# Patient Record
Sex: MALE | Race: BLACK OR AFRICAN AMERICAN | Employment: OTHER | URBAN - METROPOLITAN AREA
[De-identification: names, ages, dates, MRNs, and addresses within clinical notes are randomized per-mention and may not be internally consistent; named-entity substitution may affect disease eponyms.]

---

## 2017-08-02 ENCOUNTER — HOSPITAL ENCOUNTER (OUTPATIENT)
Dept: ULTRASOUND IMAGING | Age: 77
Discharge: HOME | End: 2017-08-02
Attending: NURSE PRACTITIONER
Payer: MEDICARE

## 2017-08-02 DIAGNOSIS — K74.69 OTHER CIRRHOSIS OF LIVER (HCC): ICD-10-CM

## 2017-08-02 DIAGNOSIS — B18.2 CHRONIC HEPATITIS C (HCC): ICD-10-CM

## 2017-08-02 PROCEDURE — 76705 ECHO EXAM OF ABDOMEN: CPT

## 2018-01-25 ENCOUNTER — HOSPITAL ENCOUNTER (OUTPATIENT)
Dept: ULTRASOUND IMAGING | Age: 78
Discharge: HOME | End: 2018-01-25
Attending: NURSE PRACTITIONER
Payer: MEDICARE

## 2018-01-25 DIAGNOSIS — K74.60 CIRRHOSIS (HCC): ICD-10-CM

## 2018-01-25 PROCEDURE — 76705 ECHO EXAM OF ABDOMEN: CPT

## 2018-07-19 ENCOUNTER — HOSPITAL ENCOUNTER (OUTPATIENT)
Dept: ULTRASOUND IMAGING | Age: 78
Discharge: HOME | End: 2018-07-19
Attending: NURSE PRACTITIONER
Payer: MEDICARE

## 2018-07-19 DIAGNOSIS — K74.60 CIRRHOSIS (HCC): ICD-10-CM

## 2018-07-19 PROCEDURE — 76705 ECHO EXAM OF ABDOMEN: CPT

## 2019-03-01 ENCOUNTER — HOSPITAL ENCOUNTER (OUTPATIENT)
Dept: ULTRASOUND IMAGING | Age: 79
Discharge: HOME | End: 2019-03-01
Attending: NURSE PRACTITIONER
Payer: MEDICARE

## 2019-03-01 DIAGNOSIS — K74.60 UNSPECIFIED CIRRHOSIS OF LIVER (HCC): ICD-10-CM

## 2019-03-01 PROCEDURE — 76705 ECHO EXAM OF ABDOMEN: CPT

## 2020-09-29 ENCOUNTER — HOSPITAL ENCOUNTER (EMERGENCY)
Age: 80
Discharge: HOME | End: 2020-09-29
Attending: EMERGENCY MEDICINE
Payer: MEDICARE

## 2020-09-29 VITALS
RESPIRATION RATE: 16 BRPM | TEMPERATURE: 98 F | OXYGEN SATURATION: 99 % | BODY MASS INDEX: 23.74 KG/M2 | SYSTOLIC BLOOD PRESSURE: 191 MMHG | HEART RATE: 69 BPM | WEIGHT: 185 LBS | HEIGHT: 74 IN | DIASTOLIC BLOOD PRESSURE: 106 MMHG

## 2020-09-29 DIAGNOSIS — R13.10 DYSPHAGIA, UNSPECIFIED TYPE: Primary | ICD-10-CM

## 2020-09-29 PROCEDURE — 99282 EMERGENCY DEPT VISIT SF MDM: CPT

## 2020-09-30 NOTE — DISCHARGE INSTRUCTIONS
Return for pain, any difficulty swallowing, fever not resolving with motrin or tylenol, shortness of breath, vomiting, decreased fluid intake, weakness, numbness, dizziness, or any change or concerns. Patient Education        Learning About the Diet for Swallowing Problems  What are swallowing problems? Difficulty swallowing is also called dysphagia (say \"ypp-NGA-qxz-jasvir\"). It is most often a sign of a problem with your throat or esophagus. This is the tube that moves food and liquids from the back of your mouth to your stomach. Trouble swallowing can occur when the muscles and nerves that move food through the throat and esophagus are not working right. To help you swallow food, your doctor or speech therapist may advise a special dysphagia diet for you. Why is a special diet important? A dysphagia diet can help you handle some problems that can occur when it's hard to swallow food and liquids easily. These problems can include:  · Malnutrition. This means you aren't getting enough healthy foods to keep your body working well. · Dehydration. This means you aren't getting enough liquids to keep your body healthy. · Aspiration. This means that food, liquid, or saliva goes down your windpipe (trachea) into your lungs, instead of down your esophagus to your stomach. This can lead to aspiration pneumonia, which is an inflammation of the lungs. What is the dysphagia diet? In the dysphagia diet, you change the foods you eat and the liquids you drink to make it easier to swallow them. You may:  · Change the texture of the foods you eat. Your doctor or speech therapist may advise you to eat one of these types of foods:  ? Easy-to-chew foods. These are foods that are soft or tender. ? Soft and bite-sized foods. These are soft foods that have been cut into small pieces. ? Minced and moist foods. These are very soft, small, and moist lumps of food that need very little chewing. ? Pureed foods.  These are foods that have been blended smooth. The puree must be thick enough to hold its shape on a spoon. These foods don't need to be chewed. ? Liquidized foods. These are foods that have been blended smooth but are not as thick as pureed foods. You can drink them from a cup. · Thicken the liquids you drink. Your doctor or speech therapist will tell you what kind of thickener to use and how thick to make the liquids. ? Slightly thick liquids. These are thicker than water but can flow through a straw. ? Mildly thick liquids. These can be sipped from a cup but take some effort to drink with a straw. ? Moderately thick liquids. These liquids are thick enough to drink from a cup or from a spoon. But they are hard to drink through a wide straw. ? Extremely thick liquids. These are thick enough to hold their shape on a spoon. They are too thick to drink from a cup or suck through a straw. Your speech therapist will help you learn exercises to train your muscles to work together so you can swallow. You may also need to learn how to position your body or how to put food in your mouth to be able to swallow better. Follow-up care is a key part of your treatment and safety. Be sure to make and go to all appointments, and call your doctor if you are having problems. It's also a good idea to know your test results and keep a list of the medicines you take. Where can you learn more? Go to http://www.gray.com/  Enter Z250 in the search box to learn more about \"Learning About the Diet for Swallowing Problems. \"  Current as of: June 26, 2019               Content Version: 12.6  © 2516-6183 [employer]. Care instructions adapted under license by [employer] (which disclaims liability or warranty for this information).  If you have questions about a medical condition or this instruction, always ask your healthcare professional. Eldon Swanson disclaims any warranty or liability for your use of this information.

## 2020-09-30 NOTE — ED PROVIDER NOTES
Pt c/o difficulty swallowing steak. Says one time 5 yrs ago, again tonight, felt like stuck in mid chest, was able to swallow it prior to being seen. No current sx's. No cp, no abd pain, no nausea, no fever or cough. No sob. No leg pain or swelling. No weakness or numbness. No diff swallowing at all currently. Says has gi physician, w appt on 10/10, declines further evaluation, req dc to f/u w him. Past Medical History:   Diagnosis Date    Hydrocele     Hypertension        Past Surgical History:   Procedure Laterality Date    HX BUNIONECTOMY Right     HX HERNIA REPAIR Right     HX OTHER SURGICAL      hydrocele surgery         No family history on file.     Social History     Socioeconomic History    Marital status:      Spouse name: Not on file    Number of children: Not on file    Years of education: Not on file    Highest education level: Not on file   Occupational History    Not on file   Social Needs    Financial resource strain: Not on file    Food insecurity     Worry: Not on file     Inability: Not on file    Transportation needs     Medical: Not on file     Non-medical: Not on file   Tobacco Use    Smoking status: Never Smoker    Smokeless tobacco: Never Used   Substance and Sexual Activity    Alcohol use: No    Drug use: No    Sexual activity: Not on file   Lifestyle    Physical activity     Days per week: Not on file     Minutes per session: Not on file    Stress: Not on file   Relationships    Social connections     Talks on phone: Not on file     Gets together: Not on file     Attends Taoism service: Not on file     Active member of club or organization: Not on file     Attends meetings of clubs or organizations: Not on file     Relationship status: Not on file    Intimate partner violence     Fear of current or ex partner: Not on file     Emotionally abused: Not on file     Physically abused: Not on file     Forced sexual activity: Not on file   Other Topics Concern    Not on file   Social History Narrative    Not on file         ALLERGIES: Patient has no known allergies. Review of Systems   Constitutional: Negative for diaphoresis and fever. HENT: Negative for congestion. Respiratory: Negative for cough and shortness of breath. Cardiovascular: Negative for chest pain. Gastrointestinal: Negative for abdominal pain and nausea. Musculoskeletal: Negative for back pain. Skin: Negative for rash. Neurological: Negative for dizziness. All other systems reviewed and are negative. Vitals:    09/29/20 2102   BP: (!) 191/106   Pulse: 69   Resp: 16   Temp: 98 °F (36.7 °C)   SpO2: 99%   Weight: 83.9 kg (185 lb)   Height: 6' 2\" (1.88 m)            Physical Exam  Vitals signs and nursing note reviewed. Constitutional:       Appearance: He is well-developed. HENT:      Head: Normocephalic and atraumatic. Comments: No difficulty w secretions  Eyes:      Conjunctiva/sclera: Conjunctivae normal.   Neck:      Musculoskeletal: Normal range of motion. Cardiovascular:      Rate and Rhythm: Normal rate and regular rhythm. Pulmonary:      Effort: Pulmonary effort is normal.      Breath sounds: No wheezing. Abdominal:      Palpations: Abdomen is soft. Tenderness: There is no abdominal tenderness. Musculoskeletal:         General: No tenderness. Skin:     General: Skin is warm and dry. Capillary Refill: Capillary refill takes less than 2 seconds. Findings: No rash. Neurological:      Mental Status: He is alert and oriented to person, place, and time. MDM       Procedures    Vitals:  Patient Vitals for the past 12 hrs:   Temp Pulse Resp BP SpO2   09/29/20 2102 98 °F (36.7 °C) 69 16 (!) 191/106 99 %         Medications ordered:   Medications - No data to display      Lab findings:  No results found for this or any previous visit (from the past 12 hour(s)).         X-Ray, CT or other radiology findings or impressions:  No orders to display       Progress notes, Consult notes or additional Procedure notes:   No diff swallowing. Pt declines further eval, no sx's, aware needs to avoid steak, no solids, chew better, smaller bites when restarts. No emc. Diagnosis:   1. Dysphagia, unspecified type        Disposition: home    Follow-up Information     Follow up With Specialties Details Why [facility] EM[facility] Emergency Medicine Go to As needed, If symptoms worsen [address] [facility]    Zachery Webb MD Gastroenterology Schedule an appointment as soon as possible for a visit in 2 days  [address] [facility] [address] [facility] [address] [facility]      Miley Farfan MD Internal Medicine Schedule an appointment as soon as possible for a visit in 1 day  127   [phone]             Discharge Medication List as of 9/29/2020  9:37 PM      CONTINUE these medications which have NOT CHANGED    Details   nebivolol (BYSTOLIC) 10 mg tablet Take 10 mg by mouth daily. , Historical Med      amLODIPine (NORVASC) 5 mg tablet Take 5 mg by mouth daily. , Historical Med

## 2020-09-30 NOTE — ED NOTES
Pt discharged to home; refused repeat BP at this time. Pt instructed to monitor his bp, follow up with his PCP, to eat small bites, chew thoroughly before swallowing. Pt instructed to return to ED for any choking/other concerns. Pt voiced his understanding. Ambulatory to home without difficulty; airway remains patent, converses without difficulty, and demonstrates no dyspnea.

## 2020-09-30 NOTE — ED TRIAGE NOTES
Patient presents to ER for C/O difficulty swallowing steak tonight, states this is the 2nd time in 3 months he has had difficulty swallowing.

## 2021-01-01 ENCOUNTER — OFFICE VISIT (OUTPATIENT)
Dept: FAMILY MEDICINE CLINIC | Age: 81
End: 2021-01-01
Payer: MEDICARE

## 2021-01-01 ENCOUNTER — APPOINTMENT (OUTPATIENT)
Dept: CT IMAGING | Age: 81
DRG: 064 | End: 2021-01-01
Attending: STUDENT IN AN ORGANIZED HEALTH CARE EDUCATION/TRAINING PROGRAM
Payer: MEDICARE

## 2021-01-01 ENCOUNTER — HOSPITAL ENCOUNTER (INPATIENT)
Age: 81
LOS: 2 days | Discharge: HOME | DRG: 064 | End: 2021-10-29
Attending: STUDENT IN AN ORGANIZED HEALTH CARE EDUCATION/TRAINING PROGRAM | Admitting: INTERNAL MEDICINE
Payer: MEDICARE

## 2021-01-01 ENCOUNTER — HOSPITAL ENCOUNTER (EMERGENCY)
Age: 81
Discharge: HOME | End: 2021-09-14
Attending: EMERGENCY MEDICINE
Payer: MEDICARE

## 2021-01-01 ENCOUNTER — APPOINTMENT (OUTPATIENT)
Dept: GENERAL RADIOLOGY | Age: 81
DRG: 064 | End: 2021-01-01
Attending: STUDENT IN AN ORGANIZED HEALTH CARE EDUCATION/TRAINING PROGRAM
Payer: MEDICARE

## 2021-01-01 ENCOUNTER — APPOINTMENT (OUTPATIENT)
Age: 81
DRG: 064 | End: 2021-01-01
Attending: STUDENT IN AN ORGANIZED HEALTH CARE EDUCATION/TRAINING PROGRAM
Payer: MEDICARE

## 2021-01-01 ENCOUNTER — APPOINTMENT (OUTPATIENT)
Dept: NON INVASIVE DIAGNOSTICS | Age: 81
DRG: 064 | End: 2021-01-01
Attending: STUDENT IN AN ORGANIZED HEALTH CARE EDUCATION/TRAINING PROGRAM
Payer: MEDICARE

## 2021-01-01 VITALS
RESPIRATION RATE: 18 BRPM | TEMPERATURE: 97.8 F | HEART RATE: 65 BPM | DIASTOLIC BLOOD PRESSURE: 80 MMHG | HEIGHT: 74 IN | OXYGEN SATURATION: 100 % | SYSTOLIC BLOOD PRESSURE: 157 MMHG | BODY MASS INDEX: 21.69 KG/M2 | WEIGHT: 169 LBS

## 2021-01-01 VITALS
HEART RATE: 64 BPM | SYSTOLIC BLOOD PRESSURE: 212 MMHG | WEIGHT: 155 LBS | RESPIRATION RATE: 23 BRPM | BODY MASS INDEX: 19.89 KG/M2 | HEIGHT: 74 IN | DIASTOLIC BLOOD PRESSURE: 104 MMHG | TEMPERATURE: 97.8 F | OXYGEN SATURATION: 99 %

## 2021-01-01 VITALS
WEIGHT: 167.2 LBS | HEIGHT: 74 IN | OXYGEN SATURATION: 97 % | HEART RATE: 77 BPM | BODY MASS INDEX: 21.46 KG/M2 | TEMPERATURE: 98.4 F | RESPIRATION RATE: 16 BRPM | DIASTOLIC BLOOD PRESSURE: 82 MMHG | SYSTOLIC BLOOD PRESSURE: 168 MMHG

## 2021-01-01 VITALS
OXYGEN SATURATION: 99 % | BODY MASS INDEX: 25.06 KG/M2 | RESPIRATION RATE: 18 BRPM | DIASTOLIC BLOOD PRESSURE: 90 MMHG | HEART RATE: 73 BPM | WEIGHT: 185 LBS | SYSTOLIC BLOOD PRESSURE: 195 MMHG | HEIGHT: 72 IN | TEMPERATURE: 97.7 F

## 2021-01-01 DIAGNOSIS — Z09 HOSPITAL DISCHARGE FOLLOW-UP: Primary | ICD-10-CM

## 2021-01-01 DIAGNOSIS — Z76.89 ENCOUNTER TO ESTABLISH CARE: ICD-10-CM

## 2021-01-01 DIAGNOSIS — Z00.00 MEDICARE ANNUAL WELLNESS VISIT, SUBSEQUENT: Primary | ICD-10-CM

## 2021-01-01 DIAGNOSIS — R29.6 FREQUENT FALLS: ICD-10-CM

## 2021-01-01 DIAGNOSIS — N18.9 CHRONIC KIDNEY DISEASE, UNSPECIFIED CKD STAGE: ICD-10-CM

## 2021-01-01 DIAGNOSIS — I51.7 LEFT ATRIAL DILATION: ICD-10-CM

## 2021-01-01 DIAGNOSIS — I10 ESSENTIAL HYPERTENSION: ICD-10-CM

## 2021-01-01 DIAGNOSIS — D64.9 ANEMIA REQUIRING TRANSFUSIONS: ICD-10-CM

## 2021-01-01 DIAGNOSIS — D63.1 ANEMIA DUE TO CHRONIC KIDNEY DISEASE, UNSPECIFIED CKD STAGE: Primary | ICD-10-CM

## 2021-01-01 DIAGNOSIS — M21.6X2: ICD-10-CM

## 2021-01-01 DIAGNOSIS — Z71.89 ACP (ADVANCE CARE PLANNING): ICD-10-CM

## 2021-01-01 DIAGNOSIS — R27.0 ATAXIA: Primary | ICD-10-CM

## 2021-01-01 DIAGNOSIS — I63.9 CEREBROVASCULAR ACCIDENT (CVA), UNSPECIFIED MECHANISM (HCC): ICD-10-CM

## 2021-01-01 DIAGNOSIS — Z99.2 ESRD (END STAGE RENAL DISEASE) ON DIALYSIS (HCC): ICD-10-CM

## 2021-01-01 DIAGNOSIS — N18.9 ANEMIA DUE TO CHRONIC KIDNEY DISEASE, UNSPECIFIED CKD STAGE: Primary | ICD-10-CM

## 2021-01-01 DIAGNOSIS — N18.6 ESRD (END STAGE RENAL DISEASE) ON DIALYSIS (HCC): ICD-10-CM

## 2021-01-01 DIAGNOSIS — B18.2 CHRONIC HEPATITIS C WITHOUT HEPATIC COMA (HCC): ICD-10-CM

## 2021-01-01 DIAGNOSIS — R79.89 ELEVATED BRAIN NATRIURETIC PEPTIDE (BNP) LEVEL: ICD-10-CM

## 2021-01-01 DIAGNOSIS — R29.898 WEAKNESS OF RIGHT LOWER EXTREMITY: ICD-10-CM

## 2021-01-01 LAB
ABO + RH BLD: NORMAL
ABO + RH BLD: NORMAL
ALBUMIN SERPL-MCNC: 3 G/DL (ref 3.4–5)
ALBUMIN SERPL-MCNC: 3.3 G/DL (ref 3.4–5)
ALBUMIN/GLOB SERPL: 0.7 {RATIO} (ref 0.8–1.7)
ALBUMIN/GLOB SERPL: 0.8 {RATIO} (ref 0.8–1.7)
ALP SERPL-CCNC: 242 U/L (ref 45–117)
ALP SERPL-CCNC: 445 U/L (ref 45–117)
ALT SERPL-CCNC: 355 U/L (ref 16–61)
ALT SERPL-CCNC: 55 U/L (ref 16–61)
AMMONIA PLAS-SCNC: 22 UMOL/L (ref 11–32)
AMPHET UR QL SCN: NEGATIVE
ANION GAP SERPL CALC-SCNC: 6 MMOL/L (ref 3–18)
ANION GAP SERPL CALC-SCNC: 8 MMOL/L (ref 3–18)
ANION GAP SERPL CALC-SCNC: 9 MMOL/L (ref 3–18)
ANION GAP SERPL CALC-SCNC: 9 MMOL/L (ref 3–18)
APPEARANCE UR: CLEAR
APTT PPP: 30 SEC (ref 23–36.4)
AST SERPL-CCNC: 403 U/L (ref 10–38)
AST SERPL-CCNC: 49 U/L (ref 10–38)
ATRIAL RATE: 65 BPM
ATRIAL RATE: 82 BPM
BACTERIA URNS QL MICRO: NEGATIVE /HPF
BARBITURATES UR QL SCN: NEGATIVE
BASOPHILS # BLD: 0 K/UL (ref 0–0.1)
BASOPHILS NFR BLD: 0 % (ref 0–2)
BASOPHILS NFR BLD: 0 % (ref 0–2)
BASOPHILS NFR BLD: 1 % (ref 0–2)
BENZODIAZ UR QL: NEGATIVE
BILIRUB SERPL-MCNC: 0.3 MG/DL (ref 0.2–1)
BILIRUB SERPL-MCNC: 0.6 MG/DL (ref 0.2–1)
BILIRUB UR QL: NEGATIVE
BLD PROD TYP BPU: NORMAL
BLOOD GROUP ANTIBODIES SERPL: NORMAL
BLOOD GROUP ANTIBODIES SERPL: NORMAL
BNP SERPL-MCNC: 8228 PG/ML (ref 0–1800)
BPU ID: NORMAL
BUN SERPL-MCNC: 47 MG/DL (ref 7–18)
BUN SERPL-MCNC: 49 MG/DL (ref 7–18)
BUN SERPL-MCNC: 61 MG/DL (ref 7–18)
BUN SERPL-MCNC: 83 MG/DL (ref 7–18)
BUN/CREAT SERPL: 10 (ref 12–20)
BUN/CREAT SERPL: 12 (ref 12–20)
BUN/CREAT SERPL: 9 (ref 12–20)
BUN/CREAT SERPL: 9 (ref 12–20)
CALCIUM SERPL-MCNC: 7.3 MG/DL (ref 8.5–10.1)
CALCIUM SERPL-MCNC: 7.6 MG/DL (ref 8.5–10.1)
CALCIUM SERPL-MCNC: 7.8 MG/DL (ref 8.5–10.1)
CALCIUM SERPL-MCNC: 8 MG/DL (ref 8.5–10.1)
CALCIUM SERPL-MCNC: 8.5 MG/DL (ref 8.5–10.1)
CALCULATED P AXIS, ECG09: 62 DEGREES
CALCULATED P AXIS, ECG09: 64 DEGREES
CALCULATED R AXIS, ECG10: -4 DEGREES
CALCULATED R AXIS, ECG10: 3 DEGREES
CALCULATED T AXIS, ECG11: 40 DEGREES
CALCULATED T AXIS, ECG11: 44 DEGREES
CALLED TO:,BCALL1: NORMAL
CANNABINOIDS UR QL SCN: NEGATIVE
CHLORIDE SERPL-SCNC: 113 MMOL/L (ref 100–111)
CHLORIDE SERPL-SCNC: 98 MMOL/L (ref 100–111)
CHLORIDE SERPL-SCNC: 98 MMOL/L (ref 100–111)
CHLORIDE SERPL-SCNC: 99 MMOL/L (ref 100–111)
CHOLEST SERPL-MCNC: 157 MG/DL
CHOLEST SERPL-MCNC: 168 MG/DL
CO2 SERPL-SCNC: 24 MMOL/L (ref 21–32)
CO2 SERPL-SCNC: 27 MMOL/L (ref 21–32)
CO2 SERPL-SCNC: 29 MMOL/L (ref 21–32)
CO2 SERPL-SCNC: 30 MMOL/L (ref 21–32)
COCAINE UR QL SCN: NEGATIVE
COLOR UR: YELLOW
CREAT SERPL-MCNC: 4.98 MG/DL (ref 0.6–1.3)
CREAT SERPL-MCNC: 5.2 MG/DL (ref 0.6–1.3)
CREAT SERPL-MCNC: 6.05 MG/DL (ref 0.6–1.3)
CREAT SERPL-MCNC: 7.18 MG/DL (ref 0.6–1.3)
CROSSMATCH RESULT,%XM: NORMAL
DIAGNOSIS, 93000: NORMAL
DIAGNOSIS, 93000: NORMAL
DIFFERENTIAL METHOD BLD: ABNORMAL
ECHO AO ASC DIAM: 3.93 CM
ECHO AO ROOT DIAM: 4.11 CM
ECHO LV E' LATERAL VELOCITY: 8.93 CM/S
ECHO LV INTERNAL DIMENSION DIASTOLIC: 4.7 CM (ref 4.2–5.9)
ECHO LV INTERNAL DIMENSION SYSTOLIC: 3.53 CM
ECHO LV IVSD: 1.35 CM (ref 0.6–1)
ECHO LV MASS 2D: 245.9 G (ref 88–224)
ECHO LV MASS INDEX 2D: 119.4 G/M2 (ref 49–115)
ECHO LV POSTERIOR WALL DIASTOLIC: 1.31 CM (ref 0.6–1)
ECHO LVOT CARDIAC OUTPUT: 5.23 LITER/MINUTE
ECHO LVOT DIAM: 2.23 CM
ECHO LVOT PEAK GRADIENT: 3.17 MMHG
ECHO LVOT PEAK VELOCITY: 89.01 CM/S
ECHO LVOT SV: 75.4 ML
ECHO LVOT VTI: 19.25 CM
ECHO MV A VELOCITY: 69.8 CM/S
ECHO MV E DECELERATION TIME (DT): 287.37 MS
ECHO MV E VELOCITY: 91.27 CM/S
ECHO MV E/A RATIO: 1.31
ECHO MV E/E' LATERAL: 10.22
ECHO RV TAPSE: 2.29 CM (ref 1.5–2)
ECHO TV REGURGITANT MAX VELOCITY: 234.62 CM/S
ECHO TV REGURGITANT PEAK GRADIENT: 22.15 MMHG
EOSINOPHIL # BLD: 0 K/UL (ref 0–0.4)
EOSINOPHIL # BLD: 0.1 K/UL (ref 0–0.4)
EOSINOPHIL # BLD: 0.4 K/UL (ref 0–0.4)
EOSINOPHIL NFR BLD: 0 % (ref 0–5)
EOSINOPHIL NFR BLD: 1 % (ref 0–5)
EOSINOPHIL NFR BLD: 8 % (ref 0–5)
EPITH CASTS URNS QL MICRO: NEGATIVE /LPF (ref 0–5)
ERYTHROCYTE [DISTWIDTH] IN BLOOD BY AUTOMATED COUNT: 15.8 % (ref 11.6–14.5)
ERYTHROCYTE [DISTWIDTH] IN BLOOD BY AUTOMATED COUNT: 16.9 % (ref 11.6–14.5)
ERYTHROCYTE [DISTWIDTH] IN BLOOD BY AUTOMATED COUNT: 17.4 % (ref 11.6–14.5)
ERYTHROCYTE [DISTWIDTH] IN BLOOD BY AUTOMATED COUNT: 17.4 % (ref 11.6–14.5)
EST. AVERAGE GLUCOSE BLD GHB EST-MCNC: ABNORMAL MG/DL
GLOBULIN SER CALC-MCNC: 4.2 G/DL (ref 2–4)
GLOBULIN SER CALC-MCNC: 4.5 G/DL (ref 2–4)
GLUCOSE BLD STRIP.AUTO-MCNC: 100 MG/DL (ref 70–110)
GLUCOSE BLD STRIP.AUTO-MCNC: 105 MG/DL (ref 70–110)
GLUCOSE BLD STRIP.AUTO-MCNC: 143 MG/DL (ref 70–110)
GLUCOSE BLD STRIP.AUTO-MCNC: 149 MG/DL (ref 70–110)
GLUCOSE BLD STRIP.AUTO-MCNC: 156 MG/DL (ref 70–110)
GLUCOSE BLD STRIP.AUTO-MCNC: 170 MG/DL (ref 70–110)
GLUCOSE SERPL-MCNC: 106 MG/DL (ref 74–99)
GLUCOSE SERPL-MCNC: 129 MG/DL (ref 74–99)
GLUCOSE SERPL-MCNC: 91 MG/DL (ref 74–99)
GLUCOSE SERPL-MCNC: 98 MG/DL (ref 74–99)
GLUCOSE UR STRIP.AUTO-MCNC: NEGATIVE MG/DL
HBA1C MFR BLD: <3.8 % (ref 4.2–5.6)
HBV SURFACE AB SER QL IA: NEGATIVE
HBV SURFACE AB SERPL IA-ACNC: <3.1 MIU/ML
HBV SURFACE AG SER QL: <0.1 INDEX
HBV SURFACE AG SER QL: NEGATIVE
HCT VFR BLD AUTO: 18.1 % (ref 36–48)
HCT VFR BLD AUTO: 19.5 % (ref 36–48)
HCT VFR BLD AUTO: 22 % (ref 36–48)
HCT VFR BLD AUTO: 30.1 % (ref 36–48)
HCT VFR BLD AUTO: 30.5 % (ref 36–48)
HDLC SERPL-MCNC: 57 MG/DL (ref 40–60)
HDLC SERPL-MCNC: 60 MG/DL (ref 40–60)
HDLC SERPL: 2.6 {RATIO} (ref 0–5)
HDLC SERPL: 2.9 {RATIO} (ref 0–5)
HDSCOM,HDSCOM: NORMAL
HEMOCCULT STL QL: NEGATIVE
HEP BS AB COMMENT,HBSAC: ABNORMAL
HGB BLD-MCNC: 5.7 G/DL (ref 13–16)
HGB BLD-MCNC: 6.2 G/DL (ref 13–16)
HGB BLD-MCNC: 6.9 G/DL (ref 13–16)
HGB BLD-MCNC: 9.5 G/DL (ref 13–16)
HGB BLD-MCNC: 9.8 G/DL (ref 13–16)
HGB UR QL STRIP: ABNORMAL
HISTORY CHECKED?,CKHIST: NORMAL
INR PPP: 1.2 (ref 0.8–1.2)
INR PPP: 1.3 (ref 0.8–1.2)
IRON SATN MFR SERPL: 9 % (ref 20–50)
IRON SERPL-MCNC: 23 UG/DL (ref 50–175)
KETONES UR QL STRIP.AUTO: NEGATIVE MG/DL
LDLC SERPL CALC-MCNC: 81.2 MG/DL (ref 0–100)
LDLC SERPL CALC-MCNC: 89.8 MG/DL (ref 0–100)
LEUKOCYTE ESTERASE UR QL STRIP.AUTO: NEGATIVE
LIPID PROFILE,FLP: NORMAL
LIPID PROFILE,FLP: NORMAL
LVOT MG: 1.68 MMHG
LYMPHOCYTES # BLD: 1 K/UL (ref 0.9–3.6)
LYMPHOCYTES # BLD: 1 K/UL (ref 0.9–3.6)
LYMPHOCYTES # BLD: 1.2 K/UL (ref 0.9–3.6)
LYMPHOCYTES NFR BLD: 10 % (ref 21–52)
LYMPHOCYTES NFR BLD: 22 % (ref 21–52)
LYMPHOCYTES NFR BLD: 8 % (ref 21–52)
MAGNESIUM SERPL-MCNC: 2.2 MG/DL (ref 1.6–2.6)
MCH RBC QN AUTO: 29.4 PG (ref 24–34)
MCH RBC QN AUTO: 29.5 PG (ref 24–34)
MCH RBC QN AUTO: 29.6 PG (ref 24–34)
MCH RBC QN AUTO: 30 PG (ref 24–34)
MCHC RBC AUTO-ENTMCNC: 31.4 G/DL (ref 31–37)
MCHC RBC AUTO-ENTMCNC: 31.5 G/DL (ref 31–37)
MCHC RBC AUTO-ENTMCNC: 31.6 G/DL (ref 31–37)
MCHC RBC AUTO-ENTMCNC: 31.8 G/DL (ref 31–37)
MCV RBC AUTO: 93.6 FL (ref 78–100)
MCV RBC AUTO: 93.8 FL (ref 78–100)
MCV RBC AUTO: 93.8 FL (ref 78–100)
MCV RBC AUTO: 94.2 FL (ref 78–100)
METHADONE UR QL: NEGATIVE
MONOCYTES # BLD: 0.8 K/UL (ref 0.05–1.2)
MONOCYTES # BLD: 1.4 K/UL (ref 0.05–1.2)
MONOCYTES # BLD: 2.1 K/UL (ref 0.05–1.2)
MONOCYTES NFR BLD: 11 % (ref 3–10)
MONOCYTES NFR BLD: 18 % (ref 3–10)
MONOCYTES NFR BLD: 18 % (ref 3–10)
NEUTS SEG # BLD: 2.3 K/UL (ref 1.8–8)
NEUTS SEG # BLD: 8.4 K/UL (ref 1.8–8)
NEUTS SEG # BLD: 9.9 K/UL (ref 1.8–8)
NEUTS SEG NFR BLD: 51 % (ref 40–73)
NEUTS SEG NFR BLD: 71 % (ref 40–73)
NEUTS SEG NFR BLD: 81 % (ref 40–73)
NITRITE UR QL STRIP.AUTO: NEGATIVE
OPIATES UR QL: NEGATIVE
P-R INTERVAL, ECG05: 196 MS
P-R INTERVAL, ECG05: 202 MS
PCP UR QL: NEGATIVE
PH UR STRIP: 6 [PH] (ref 5–8)
PHOSPHATE SERPL-MCNC: 4.8 MG/DL (ref 2.5–4.9)
PLATELET # BLD AUTO: 104 K/UL (ref 135–420)
PLATELET # BLD AUTO: 153 K/UL (ref 135–420)
PLATELET # BLD AUTO: 154 K/UL (ref 135–420)
PLATELET # BLD AUTO: 163 K/UL (ref 135–420)
PLATELET COMMENTS,PCOM: ABNORMAL
PMV BLD AUTO: 10.8 FL (ref 9.2–11.8)
PMV BLD AUTO: 11.2 FL (ref 9.2–11.8)
PMV BLD AUTO: 11.4 FL (ref 9.2–11.8)
PMV BLD AUTO: 11.4 FL (ref 9.2–11.8)
POTASSIUM SERPL-SCNC: 4 MMOL/L (ref 3.5–5.5)
POTASSIUM SERPL-SCNC: 4.1 MMOL/L (ref 3.5–5.5)
POTASSIUM SERPL-SCNC: 4.1 MMOL/L (ref 3.5–5.5)
POTASSIUM SERPL-SCNC: 5.2 MMOL/L (ref 3.5–5.5)
PROT SERPL-MCNC: 7.5 G/DL (ref 6.4–8.2)
PROT SERPL-MCNC: 7.5 G/DL (ref 6.4–8.2)
PROT UR STRIP-MCNC: 300 MG/DL
PROTHROMBIN TIME: 14.5 SEC (ref 11.5–15.2)
PROTHROMBIN TIME: 16.2 SEC (ref 11.5–15.2)
PTH-INTACT SERPL-MCNC: 513.3 PG/ML (ref 18.4–88)
Q-T INTERVAL, ECG07: 418 MS
Q-T INTERVAL, ECG07: 434 MS
QRS DURATION, ECG06: 88 MS
QRS DURATION, ECG06: 90 MS
QTC CALCULATION (BEZET), ECG08: 451 MS
QTC CALCULATION (BEZET), ECG08: 457 MS
RBC # BLD AUTO: 1.93 M/UL (ref 4.35–5.65)
RBC # BLD AUTO: 2.07 M/UL (ref 4.35–5.65)
RBC # BLD AUTO: 2.35 M/UL (ref 4.35–5.65)
RBC # BLD AUTO: 3.21 M/UL (ref 4.35–5.65)
RBC #/AREA URNS HPF: NORMAL /HPF (ref 0–5)
RBC MORPH BLD: ABNORMAL
SODIUM SERPL-SCNC: 135 MMOL/L (ref 136–145)
SODIUM SERPL-SCNC: 136 MMOL/L (ref 136–145)
SODIUM SERPL-SCNC: 136 MMOL/L (ref 136–145)
SODIUM SERPL-SCNC: 143 MMOL/L (ref 136–145)
SP GR UR REFRACTOMETRY: 1.01 (ref 1–1.03)
SPECIMEN EXP DATE BLD: NORMAL
SPECIMEN EXP DATE BLD: NORMAL
STATUS OF UNIT,%ST: NORMAL
TIBC SERPL-MCNC: 250 UG/DL (ref 250–450)
TRIGL SERPL-MCNC: 106 MG/DL (ref ?–150)
TRIGL SERPL-MCNC: 79 MG/DL (ref ?–150)
TROPONIN I SERPL-MCNC: 0.03 NG/ML (ref 0–0.04)
TROPONIN I SERPL-MCNC: 0.03 NG/ML (ref 0–0.04)
TSH SERPL DL<=0.05 MIU/L-ACNC: 3.61 UIU/ML (ref 0.36–3.74)
UNIT DIVISION, %UDIV: 0
UROBILINOGEN UR QL STRIP.AUTO: 0.2 EU/DL (ref 0.2–1)
VENTRICULAR RATE, ECG03: 65 BPM
VENTRICULAR RATE, ECG03: 72 BPM
VLDLC SERPL CALC-MCNC: 15.8 MG/DL
VLDLC SERPL CALC-MCNC: 21.2 MG/DL
WBC # BLD AUTO: 11.8 K/UL (ref 4.6–13.2)
WBC # BLD AUTO: 12.3 K/UL (ref 4.6–13.2)
WBC # BLD AUTO: 12.7 K/UL (ref 4.6–13.2)
WBC # BLD AUTO: 4.5 K/UL (ref 4.6–13.2)
WBC URNS QL MICRO: NORMAL /HPF (ref 0–4)

## 2021-01-01 PROCEDURE — 82140 ASSAY OF AMMONIA: CPT

## 2021-01-01 PROCEDURE — 97161 PT EVAL LOW COMPLEX 20 MIN: CPT

## 2021-01-01 PROCEDURE — 2709999900 HC NON-CHARGEABLE SUPPLY

## 2021-01-01 PROCEDURE — 84484 ASSAY OF TROPONIN QUANT: CPT

## 2021-01-01 PROCEDURE — G0439 PPPS, SUBSEQ VISIT: HCPCS | Performed by: NURSE PRACTITIONER

## 2021-01-01 PROCEDURE — 84443 ASSAY THYROID STIM HORMONE: CPT

## 2021-01-01 PROCEDURE — G8427 DOCREV CUR MEDS BY ELIG CLIN: HCPCS | Performed by: NURSE PRACTITIONER

## 2021-01-01 PROCEDURE — 92610 EVALUATE SWALLOWING FUNCTION: CPT

## 2021-01-01 PROCEDURE — 65660000000 HC RM CCU STEPDOWN

## 2021-01-01 PROCEDURE — 74011250637 HC RX REV CODE- 250/637: Performed by: INTERNAL MEDICINE

## 2021-01-01 PROCEDURE — 99285 EMERGENCY DEPT VISIT HI MDM: CPT

## 2021-01-01 PROCEDURE — 82962 GLUCOSE BLOOD TEST: CPT

## 2021-01-01 PROCEDURE — 70496 CT ANGIOGRAPHY HEAD: CPT

## 2021-01-01 PROCEDURE — 74011250636 HC RX REV CODE- 250/636: Performed by: STUDENT IN AN ORGANIZED HEALTH CARE EDUCATION/TRAINING PROGRAM

## 2021-01-01 PROCEDURE — 83036 HEMOGLOBIN GLYCOSYLATED A1C: CPT

## 2021-01-01 PROCEDURE — 83735 ASSAY OF MAGNESIUM: CPT

## 2021-01-01 PROCEDURE — 74011250636 HC RX REV CODE- 250/636: Performed by: INTERNAL MEDICINE

## 2021-01-01 PROCEDURE — 83540 ASSAY OF IRON: CPT

## 2021-01-01 PROCEDURE — 80053 COMPREHEN METABOLIC PANEL: CPT

## 2021-01-01 PROCEDURE — 36430 TRANSFUSION BLD/BLD COMPNT: CPT

## 2021-01-01 PROCEDURE — 80061 LIPID PANEL: CPT

## 2021-01-01 PROCEDURE — 85610 PROTHROMBIN TIME: CPT

## 2021-01-01 PROCEDURE — 74011000636 HC RX REV CODE- 636: Performed by: INTERNAL MEDICINE

## 2021-01-01 PROCEDURE — G8420 CALC BMI NORM PARAMETERS: HCPCS | Performed by: NURSE PRACTITIONER

## 2021-01-01 PROCEDURE — 5A1D70Z PERFORMANCE OF URINARY FILTRATION, INTERMITTENT, LESS THAN 6 HOURS PER DAY: ICD-10-PCS | Performed by: FAMILY MEDICINE

## 2021-01-01 PROCEDURE — 99238 HOSP IP/OBS DSCHRG MGMT 30/<: CPT | Performed by: FAMILY MEDICINE

## 2021-01-01 PROCEDURE — 85025 COMPLETE CBC W/AUTO DIFF WBC: CPT

## 2021-01-01 PROCEDURE — 85027 COMPLETE CBC AUTOMATED: CPT

## 2021-01-01 PROCEDURE — 36415 COLL VENOUS BLD VENIPUNCTURE: CPT

## 2021-01-01 PROCEDURE — 93005 ELECTROCARDIOGRAM TRACING: CPT

## 2021-01-01 PROCEDURE — 84100 ASSAY OF PHOSPHORUS: CPT

## 2021-01-01 PROCEDURE — 86706 HEP B SURFACE ANTIBODY: CPT

## 2021-01-01 PROCEDURE — 99223 1ST HOSP IP/OBS HIGH 75: CPT | Performed by: STUDENT IN AN ORGANIZED HEALTH CARE EDUCATION/TRAINING PROGRAM

## 2021-01-01 PROCEDURE — 82272 OCCULT BLD FECES 1-3 TESTS: CPT

## 2021-01-01 PROCEDURE — 74011250637 HC RX REV CODE- 250/637: Performed by: FAMILY MEDICINE

## 2021-01-01 PROCEDURE — 99497 ADVNCD CARE PLAN 30 MIN: CPT | Performed by: NURSE PRACTITIONER

## 2021-01-01 PROCEDURE — 83970 ASSAY OF PARATHORMONE: CPT

## 2021-01-01 PROCEDURE — 86901 BLOOD TYPING SEROLOGIC RH(D): CPT

## 2021-01-01 PROCEDURE — 1101F PT FALLS ASSESS-DOCD LE1/YR: CPT | Performed by: NURSE PRACTITIONER

## 2021-01-01 PROCEDURE — 74011000250 HC RX REV CODE- 250: Performed by: FAMILY MEDICINE

## 2021-01-01 PROCEDURE — 86923 COMPATIBILITY TEST ELECTRIC: CPT

## 2021-01-01 PROCEDURE — 1111F DSCHRG MED/CURRENT MED MERGE: CPT | Performed by: NURSE PRACTITIONER

## 2021-01-01 PROCEDURE — 74011250637 HC RX REV CODE- 250/637: Performed by: STUDENT IN AN ORGANIZED HEALTH CARE EDUCATION/TRAINING PROGRAM

## 2021-01-01 PROCEDURE — 74011250637 HC RX REV CODE- 250/637: Performed by: EMERGENCY MEDICINE

## 2021-01-01 PROCEDURE — 74011000250 HC RX REV CODE- 250: Performed by: INTERNAL MEDICINE

## 2021-01-01 PROCEDURE — 74011250636 HC RX REV CODE- 250/636: Performed by: EMERGENCY MEDICINE

## 2021-01-01 PROCEDURE — G8432 DEP SCR NOT DOC, RNG: HCPCS | Performed by: NURSE PRACTITIONER

## 2021-01-01 PROCEDURE — 85018 HEMOGLOBIN: CPT

## 2021-01-01 PROCEDURE — C9113 INJ PANTOPRAZOLE SODIUM, VIA: HCPCS | Performed by: INTERNAL MEDICINE

## 2021-01-01 PROCEDURE — G8536 NO DOC ELDER MAL SCRN: HCPCS | Performed by: NURSE PRACTITIONER

## 2021-01-01 PROCEDURE — 81001 URINALYSIS AUTO W/SCOPE: CPT

## 2021-01-01 PROCEDURE — 99204 OFFICE O/P NEW MOD 45 MIN: CPT | Performed by: NURSE PRACTITIONER

## 2021-01-01 PROCEDURE — 70551 MRI BRAIN STEM W/O DYE: CPT

## 2021-01-01 PROCEDURE — 96374 THER/PROPH/DIAG INJ IV PUSH: CPT

## 2021-01-01 PROCEDURE — 87340 HEPATITIS B SURFACE AG IA: CPT

## 2021-01-01 PROCEDURE — 97165 OT EVAL LOW COMPLEX 30 MIN: CPT

## 2021-01-01 PROCEDURE — 83880 ASSAY OF NATRIURETIC PEPTIDE: CPT

## 2021-01-01 PROCEDURE — 85730 THROMBOPLASTIN TIME PARTIAL: CPT

## 2021-01-01 PROCEDURE — 93306 TTE W/DOPPLER COMPLETE: CPT

## 2021-01-01 PROCEDURE — 70450 CT HEAD/BRAIN W/O DYE: CPT

## 2021-01-01 PROCEDURE — 30233N1 TRANSFUSION OF NONAUTOLOGOUS RED BLOOD CELLS INTO PERIPHERAL VEIN, PERCUTANEOUS APPROACH: ICD-10-PCS | Performed by: INTERNAL MEDICINE

## 2021-01-01 PROCEDURE — 80307 DRUG TEST PRSMV CHEM ANLYZR: CPT

## 2021-01-01 PROCEDURE — 92526 ORAL FUNCTION THERAPY: CPT

## 2021-01-01 PROCEDURE — 71045 X-RAY EXAM CHEST 1 VIEW: CPT

## 2021-01-01 PROCEDURE — 80048 BASIC METABOLIC PNL TOTAL CA: CPT

## 2021-01-01 PROCEDURE — P9016 RBC LEUKOCYTES REDUCED: HCPCS

## 2021-01-01 PROCEDURE — 99223 1ST HOSP IP/OBS HIGH 75: CPT | Performed by: FAMILY MEDICINE

## 2021-01-01 PROCEDURE — 90935 HEMODIALYSIS ONE EVALUATION: CPT

## 2021-01-01 RX ORDER — HEPARIN SODIUM 5000 [USP'U]/ML
5000 INJECTION, SOLUTION INTRAVENOUS; SUBCUTANEOUS EVERY 8 HOURS
Status: DISCONTINUED | OUTPATIENT
Start: 2021-01-01 | End: 2021-01-01 | Stop reason: HOSPADM

## 2021-01-01 RX ORDER — AMLODIPINE BESYLATE 10 MG/1
10 TABLET ORAL DAILY
Qty: 90 TABLET | Refills: 0 | Status: SHIPPED | OUTPATIENT
Start: 2021-01-01

## 2021-01-01 RX ORDER — SILDENAFIL 100 MG/1
TABLET, FILM COATED ORAL
COMMUNITY
Start: 2020-12-18

## 2021-01-01 RX ORDER — ASPIRIN 325 MG
325 TABLET ORAL DAILY
Qty: 30 TABLET | Refills: 0 | Status: SHIPPED | OUTPATIENT
Start: 2021-01-01

## 2021-01-01 RX ORDER — LOSARTAN POTASSIUM 100 MG/1
50 TABLET ORAL DAILY
COMMUNITY
Start: 2021-03-01 | End: 2021-01-01 | Stop reason: CLARIF

## 2021-01-01 RX ORDER — FUROSEMIDE 10 MG/ML
20 INJECTION INTRAMUSCULAR; INTRAVENOUS ONCE
Status: DISCONTINUED | OUTPATIENT
Start: 2021-01-01 | End: 2021-01-01

## 2021-01-01 RX ORDER — ASPIRIN 325 MG
325 TABLET ORAL DAILY
Status: DISCONTINUED | OUTPATIENT
Start: 2021-01-01 | End: 2021-01-01 | Stop reason: HOSPADM

## 2021-01-01 RX ORDER — SODIUM CHLORIDE 9 MG/ML
250 INJECTION, SOLUTION INTRAVENOUS AS NEEDED
Status: DISCONTINUED | OUTPATIENT
Start: 2021-01-01 | End: 2021-01-01 | Stop reason: HOSPADM

## 2021-01-01 RX ORDER — ATORVASTATIN CALCIUM 40 MG/1
80 TABLET, FILM COATED ORAL
Status: DISCONTINUED | OUTPATIENT
Start: 2021-01-01 | End: 2021-01-01

## 2021-01-01 RX ORDER — TAMSULOSIN HYDROCHLORIDE 0.4 MG/1
1 CAPSULE ORAL
COMMUNITY

## 2021-01-01 RX ORDER — ATORVASTATIN CALCIUM 20 MG/1
20 TABLET, FILM COATED ORAL
Qty: 30 TABLET | Refills: 0 | Status: SHIPPED | OUTPATIENT
Start: 2021-01-01 | End: 2022-01-01 | Stop reason: SDUPTHER

## 2021-01-01 RX ORDER — HYDRALAZINE HYDROCHLORIDE 25 MG/1
25 TABLET, FILM COATED ORAL 3 TIMES DAILY
Qty: 90 TABLET | Refills: 0 | Status: CANCELLED | OUTPATIENT
Start: 2021-01-01

## 2021-01-01 RX ORDER — FAMOTIDINE 20 MG/1
20 TABLET, FILM COATED ORAL EVERY EVENING
Status: DISCONTINUED | OUTPATIENT
Start: 2021-01-01 | End: 2021-01-01

## 2021-01-01 RX ORDER — AMLODIPINE BESYLATE 5 MG/1
5 TABLET ORAL
Status: COMPLETED | OUTPATIENT
Start: 2021-01-01 | End: 2021-01-01

## 2021-01-01 RX ORDER — LABETALOL HCL 20 MG/4 ML
5 SYRINGE (ML) INTRAVENOUS
Status: DISCONTINUED | OUTPATIENT
Start: 2021-01-01 | End: 2021-01-01 | Stop reason: HOSPADM

## 2021-01-01 RX ORDER — LOSARTAN POTASSIUM 100 MG/1
100 TABLET ORAL DAILY
Qty: 90 TABLET | Refills: 0 | Status: SHIPPED | OUTPATIENT
Start: 2021-01-01 | End: 2022-01-01 | Stop reason: SDUPTHER

## 2021-01-01 RX ORDER — LOSARTAN POTASSIUM 50 MG/1
50 TABLET ORAL DAILY
COMMUNITY
End: 2021-01-01 | Stop reason: DRUGHIGH

## 2021-01-01 RX ORDER — ACETAMINOPHEN 325 MG/1
650 TABLET ORAL
Status: DISCONTINUED | OUTPATIENT
Start: 2021-01-01 | End: 2021-01-01 | Stop reason: HOSPADM

## 2021-01-01 RX ORDER — ASPIRIN 325 MG
325 TABLET ORAL
Status: DISCONTINUED | OUTPATIENT
Start: 2021-01-01 | End: 2021-01-01

## 2021-01-01 RX ORDER — HYDRALAZINE HYDROCHLORIDE 25 MG/1
25 TABLET, FILM COATED ORAL ONCE
Status: COMPLETED | OUTPATIENT
Start: 2021-01-01 | End: 2021-01-01

## 2021-01-01 RX ORDER — FUROSEMIDE 10 MG/ML
40 INJECTION INTRAMUSCULAR; INTRAVENOUS ONCE
Status: COMPLETED | OUTPATIENT
Start: 2021-01-01 | End: 2021-01-01

## 2021-01-01 RX ORDER — SODIUM CHLORIDE 9 MG/ML
10 INJECTION INTRAMUSCULAR; INTRAVENOUS; SUBCUTANEOUS
Status: COMPLETED | OUTPATIENT
Start: 2021-01-01 | End: 2021-01-01

## 2021-01-01 RX ORDER — ELBASVIR AND GRAZOPREVIR 50; 100 MG/1; MG/1
1 TABLET, FILM COATED ORAL DAILY
COMMUNITY

## 2021-01-01 RX ORDER — ATORVASTATIN CALCIUM 10 MG/1
10 TABLET, FILM COATED ORAL
Status: DISCONTINUED | OUTPATIENT
Start: 2021-01-01 | End: 2021-01-01

## 2021-01-01 RX ORDER — ATORVASTATIN CALCIUM 20 MG/1
20 TABLET, FILM COATED ORAL
Status: DISCONTINUED | OUTPATIENT
Start: 2021-01-01 | End: 2021-01-01 | Stop reason: HOSPADM

## 2021-01-01 RX ORDER — HYDRALAZINE HYDROCHLORIDE 20 MG/ML
10 INJECTION INTRAMUSCULAR; INTRAVENOUS
Status: DISCONTINUED | OUTPATIENT
Start: 2021-01-01 | End: 2021-01-01

## 2021-01-01 RX ORDER — ACETAMINOPHEN 650 MG/1
650 SUPPOSITORY RECTAL
Status: DISCONTINUED | OUTPATIENT
Start: 2021-01-01 | End: 2021-01-01 | Stop reason: HOSPADM

## 2021-01-01 RX ADMIN — SODIUM CHLORIDE 40 MG: 9 INJECTION, SOLUTION INTRAMUSCULAR; INTRAVENOUS; SUBCUTANEOUS at 12:13

## 2021-01-01 RX ADMIN — FUROSEMIDE 40 MG: 10 INJECTION, SOLUTION INTRAMUSCULAR; INTRAVENOUS at 01:51

## 2021-01-01 RX ADMIN — ASPIRIN 325 MG ORAL TABLET 325 MG: 325 PILL ORAL at 09:57

## 2021-01-01 RX ADMIN — SODIUM CHLORIDE 40 MG: 9 INJECTION, SOLUTION INTRAMUSCULAR; INTRAVENOUS; SUBCUTANEOUS at 21:42

## 2021-01-01 RX ADMIN — HYDRALAZINE HYDROCHLORIDE 25 MG: 25 TABLET, FILM COATED ORAL at 18:47

## 2021-01-01 RX ADMIN — IOPAMIDOL 70 ML: 755 INJECTION, SOLUTION INTRAVENOUS at 13:12

## 2021-01-01 RX ADMIN — ATORVASTATIN CALCIUM 20 MG: 20 TABLET, FILM COATED ORAL at 21:42

## 2021-01-01 RX ADMIN — AMLODIPINE BESYLATE 5 MG: 5 TABLET ORAL at 02:20

## 2021-01-01 RX ADMIN — SODIUM CHLORIDE 10 ML: 9 INJECTION INTRAMUSCULAR; INTRAVENOUS; SUBCUTANEOUS at 11:43

## 2021-01-01 RX ADMIN — SODIUM CHLORIDE 40 MG: 9 INJECTION, SOLUTION INTRAMUSCULAR; INTRAVENOUS; SUBCUTANEOUS at 10:18

## 2021-01-01 RX ADMIN — ASPIRIN 325 MG ORAL TABLET 325 MG: 325 PILL ORAL at 12:13

## 2021-09-13 NOTE — ED TRIAGE NOTES
Patient states being advised by nephrologist, Dr. Zulay Cotto, to report to ER for further evaluation of abnormal lab result. Patient and spouse can not recall the name of the lab in question. Patient asymptomatic.

## 2021-09-13 NOTE — ED PROVIDER NOTES
EMERGENCY DEPARTMENT HISTORY AND PHYSICAL EXAM    5:45 PM  Date: 9/13/2021  Patient Name: Jes Ring    History of Presenting Illness       History Provided By:     HPI: Jes Ring is a 79 y.o. male  with past medical history of chronic kidney disease presents with concern for abnormal labs that were drawn last week. Patient is not sure what lab. Patient denies any shortness of breath, chest pain but complains of fatigue. Patient denies of any blood in the stool. Informed by Dr. Lenka Merino patient's nephrologist's partner that patient's potassium and creatinine was elevated. PCP: Esther Conley MD    Past History     Past Medical History:  Past Medical History:   Diagnosis Date    Hydrocele     Hypertension        Past Surgical History:  Past Surgical History:   Procedure Laterality Date    HX BUNIONECTOMY Right     HX HERNIA REPAIR Right     HX OTHER SURGICAL      hydrocele surgery       Family History:  History reviewed. No pertinent family history. Social History:  Social History     Tobacco Use    Smoking status: Never Smoker    Smokeless tobacco: Never Used   Substance Use Topics    Alcohol use: No    Drug use: No       Allergies:  No Known Allergies    Review of Systems   Review of Systems   Constitutional: Positive for fatigue. Negative for activity change, appetite change and chills. HENT: Negative for congestion, ear discharge, ear pain and sore throat. Eyes: Negative for photophobia and pain. Respiratory: Negative for cough and choking. Cardiovascular: Negative for palpitations and leg swelling. Gastrointestinal: Negative for anal bleeding and rectal pain. Endocrine: Negative for polydipsia and polyuria. Genitourinary: Negative for genital sores and urgency. Musculoskeletal: Negative for arthralgias and myalgias. Neurological: Negative for dizziness, seizures and speech difficulty.    Psychiatric/Behavioral: Negative for hallucinations, self-injury and suicidal ideas. Physical Exam     Patient Vitals for the past 12 hrs:   Temp Pulse Resp BP SpO2   21 1735 98.6 °F (37 °C) 71 16 (!) 180/87 100 %       Physical Exam  Vitals and nursing note reviewed. Constitutional:       Appearance: He is well-developed. HENT:      Head: Normocephalic and atraumatic. Eyes:      General:         Right eye: No discharge. Left eye: No discharge. Cardiovascular:      Rate and Rhythm: Normal rate and regular rhythm. Heart sounds: Normal heart sounds. No murmur heard. Pulmonary:      Effort: Pulmonary effort is normal. No respiratory distress. Breath sounds: Normal breath sounds. No stridor. No wheezing or rales. Chest:      Chest wall: No tenderness. Abdominal:      General: Bowel sounds are normal. There is no distension. Palpations: Abdomen is soft. Tenderness: There is no abdominal tenderness. There is no guarding or rebound. Musculoskeletal:         General: Normal range of motion. Cervical back: Normal range of motion and neck supple. Skin:     General: Skin is warm and dry. Neurological:      Mental Status: He is alert and oriented to person, place, and time. Diagnostic Study Results     Labs -  No results found for this or any previous visit (from the past 12 hour(s)). Radiologic Studies -   No results found. Medical Decision Making     ED Course: Progress Notes, Reevaluation, and Consults:    5:45 PM Initial assessment performed. The patients presenting problems have been discussed, and they/their family are in agreement with the care plan formulated and outlined with them. I have encouraged them to ask questions as they arise throughout their visit.       Provider Notes (Medical Decision Making):   Patient presents with concern for abnormal labs  Creatinine 7.18,  Potassium 5.2  Hemoglobin 6.9-patient experiences fatigue  EK, normal sinus rhythm, LVH  Discussed with Dr. Jessica Stevens nephrologist patient will get blood transfusion  Recommended that patient gets Lasix before transfusion, patient noted to be hypertensive to 180/87, will repeat blood pressure after measurement  Discussed with patient who agrees with transfusion  I have discussed with the patient the rationale for blood component transfusion; its benefits in treating or preventing fatigue, organ damage, or death; and its risk which includes mild transfusion reactions, rare risk of blood borne infection, or more serious but rare reactions. I have discussed the alternatives to transfusion, including the risk and consequences of not receiving transfusion. The patient had an opportunity to ask questions and had agreed to proceed with transfusion of blood components. 7:37 PM : Pt care transferred to Dr. Caitlyn Kaur ,ED provider. History of patient complaint(s), available diagnostic reports and current treatment plan has been discussed thoroughly. Intended disposition of patient : Home    Pending transfusion and reassessment    Dr. Roberth Shane assistance in completion of this plan is greatly appreciated but it should be noted that I will be the provider of record for this patient. Vital Signs-Reviewed the patient's vital signs. Reviewed pt's pulse ox reading. Records Reviewed: old medical records  -I am the first provider for this patient.  -I reviewed the vital signs, available nursing notes, past medical history, past surgical history, family history and social history. Current Outpatient Medications   Medication Sig Dispense Refill    losartan (COZAAR) 100 mg tablet Take 50 mg by mouth daily.  amLODIPine (NORVASC) 5 mg tablet Take 5 mg by mouth daily.  nebivolol (BYSTOLIC) 10 mg tablet Take 10 mg by mouth daily. Clinical Impression     Clinical Impression: No diagnosis found. Disposition: This note was dictated utilizing voice recognition software which may lead to typographical errors.   I apologize in advance if the situation occurs. If questions arise please do not hesitate to contact me or call our department.     Cori Walter MD  5:45 PM

## 2021-09-14 NOTE — ED NOTES
Assumed care for the patient from Dr. Mandie Morales pending blood transfusion for anemia of chronic disease. Patient received 1 unit and had a dose of Lasix. Blood pressure was elevated however the patient was not in distress or symptomatic. Has not taken his home meds I ordered him a dose of his Norvasc. Patient was discharged to family care.

## 2021-09-14 NOTE — ED NOTES
Written and verbal discharge instructions given. Patient verbalizes understanding of same. Patient denies  further questions about treatment and discharge instructions. Left ED with patent airway and steady gait. Arm band removed shredded. Wife driving pt home.

## 2021-10-27 PROBLEM — D64.9 ANEMIA REQUIRING TRANSFUSIONS: Status: ACTIVE | Noted: 2021-01-01

## 2021-10-27 PROBLEM — R53.1 WEAKNESS: Status: ACTIVE | Noted: 2021-01-01

## 2021-10-27 PROBLEM — R27.0 ATAXIA: Status: ACTIVE | Noted: 2021-01-01

## 2021-10-27 PROBLEM — R29.898 LEG WEAKNESS: Status: ACTIVE | Noted: 2021-01-01

## 2021-10-27 NOTE — ED NOTES
Pt has TDC to right chest.    Pt last rec'd dialysis yesterday, unsure of what location they use. Refers to it as \"washing the blood\"    Pt states weakness onset on Sunday.

## 2021-10-27 NOTE — ED NOTES
Ortho Stat VS    Laying: /66, P 68, R 16, O2 96%  Sitting: BP  134/66, P 72, R 20, O2 100%  Standing /51, P 70, R 20, O2 96%

## 2021-10-27 NOTE — ED TRIAGE NOTES
Pt presents with c/o right leg weakness and 'heaviness' which caused pt to fall on Sunday and Tuesday. Pt denies any loc with fall. Pt reports dizziness.

## 2021-10-27 NOTE — Clinical Note
Status[de-identified] INPATIENT [101]   Type of Bed: Telemetry [19]   Cardiac Monitoring Required?: No   Inpatient Hospitalization Certified Necessary for the Following Reasons: 9.  Other (further clarification in H&P documentation)   Admitting Diagnosis: Ataxia [119517]   Admitting Diagnosis: Weakness [793030]   Admitting Diagnosis: Anemia requiring transfusions [[id number]]   Admitting Physician: Evette Ledbetter [[id number]]   Attending Physician: Violette Loyd   Estimated Length of Stay: 2 Midnights   Discharge Plan[de-identified] Other (Specify)

## 2021-10-27 NOTE — ROUTINE PROCESS
TRANSFER - IN REPORT:    Verbal report received from Meenu Mathews RN(name) on [facility]  being received from [facility] 5) for routine progression of care      Report consisted of patients Situation, Background, Assessment and   Recommendations(SBAR). Information from the following report(s) SBAR, Kardex, Intake/Output, MAR and Recent Results was reviewed with the receiving nurse. Opportunity for questions and clarification was provided. Assessment completed upon patients arrival to unit and care assumed.

## 2021-10-27 NOTE — ROUTINE PROCESS
Verbal and Written shift change report given to Kaylie VALENCIA (oncoming nurse) by Andrzej Galeana RN (offgoing nurse). Report included the following information SBAR, Kardex, Intake/Output, MAR and Recent Results.

## 2021-10-27 NOTE — ED NOTES
EMERGENCY DEPARTMENT HISTORY AND PHYSICAL EXAM      Patient Name: Malinda Zhu    History of Presenting Illness     HPI:     79-year-old male with a history of HTN, ESRD on HD TTS, the ED accompanied by family for evaluation of intermittent dizziness and right lower extremity weakness since Sunday. Patient has had 2 falls. One fall on Sunday when he felt his right leg become heavy, no head injury or LOC. Additionally had another fall yesterday per family. Patient does not state he has any right leg weakness at this time however feels a little unsteady on his feet when walking, again symptoms have been ongoing since Sunday. Denies any headache, blurry or double vision, room spinning sensation, hearing loss, chest pain, dyspnea, presyncope, syncope, abdominal pain, back pain, neck pain. Per review of records, his nephrologist is Dr. Ying Molina. PCP: Stephenie Ahumada, MD    No current facility-administered medications on file prior to encounter. Current Outpatient Medications on File Prior to Encounter   Medication Sig Dispense Refill    losartan (COZAAR) 100 mg tablet Take 50 mg by mouth daily.  nebivolol (BYSTOLIC) 10 mg tablet Take 10 mg by mouth daily.  amLODIPine (NORVASC) 5 mg tablet Take 5 mg by mouth daily. Past History     Past Medical History:  Past Medical History:   Diagnosis Date    Hydrocele     Hypertension        Past Surgical History:  Past Surgical History:   Procedure Laterality Date    HX BUNIONECTOMY Right     HX HERNIA REPAIR Right     HX OTHER SURGICAL      hydrocele surgery       Family History:  No family history on file. Social History:  Social History     Tobacco Use    Smoking status: Never Smoker    Smokeless tobacco: Never Used   Substance Use Topics    Alcohol use: No    Drug use: No       Allergies:  No Known Allergies    Review of Nursing Notes: I have reviewed the relevant nursing notes that were available at the time of this entry.  Portions of the Family and Social history, as well as medications and allergies, may have been entered into my documentation by nursing or other ancillary staff; I have confirmed and may have supplemented that information to the best of my ability at the time the note was reviewed. There are some disagreements between the nursing notes and my evaluation at times. Some of the above referenced nursing documentation appears in my note after completion of my review. Review of Systems     CONSTITUTIONAL: Denies fevers, chills, sweats, or weight changes. EYES: Denies any visual changes or symptoms  HENT: Denies headaches, changes to hearing, rhinitis, sore throat, dysphagia, or change in voice. CV: Denies chest pains or palpitations. Lungs/Chest: Denies dyspnea, wheezing, or cough. GI: Denies nausea, vomiting, diarrhea, constipation, abdominal pain, hematochezia, or melena. : Denies urinary retention or incontinence. No genital discharge. No dysuria. MSK: Denies myalgias or arthralgias. NEURO: Denies chronic headaches or seizures. No numbness, tingling. PSYCHIATRIC: Denies problems with mood disturbance and anxiety. DERMATOLOGIC: Denies any rashes or skin changes. Physical Exam     General: In no apparent distress. Well-nourished/well-developed. Head/Neck: Normocephalic, atraumatic. Nontender midline neck, normal ROM. EENT: PERRLA. EOM intact bilaterally. Oropharyngeal mucosa is moist, lesions or erythema. No nasal discharge. Cardiovascular: RRR, no murmurs, gallops, or rubs. Peripheral pulses normal and intact in BUE and BLE. Lungs/Chest: CTAB, no wheezing, rhonchi, or rales. Abdomen: No distention. No organomegaly. No rebound, rigidity, or guarding. Nontender. Extremities/Skin: Warm distal extremities No deformities. No edema. No rashes. Neuro: A&O x 3. Grossly intact sensations and motor function in upper and lower extremities bilaterally.   He does have an unsteady gait and mild asterixis in the bilateral upper extremities. Psych: Appropriate mood and affect. Diagnostic Study Results     Labs -     Recent Results (from the past 12 hour(s))   METABOLIC PANEL, COMPREHENSIVE    Collection Time: 10/27/21 11:15 AM   Result Value Ref Range    Sodium 136 136 - 145 mmol/L    Potassium 4.0 3.5 - 5.5 mmol/L    Chloride 98 (L) 100 - 111 mmol/L    CO2 30 21 - 32 mmol/L    Anion gap 8 3.0 - 18 mmol/L    Glucose 129 (H) 74 - 99 mg/dL    BUN 49 (H) 7.0 - 18 MG/DL    Creatinine 5.20 (H) 0.6 - 1.3 MG/DL    BUN/Creatinine ratio 9 (L) 12 - 20      GFR est AA 13 (L) >60 ml/min/1.73m2    GFR est non-AA 11 (L) >60 ml/min/1.73m2    Calcium 7.3 (L) 8.5 - 10.1 MG/DL    Bilirubin, total 0.6 0.2 - 1.0 MG/DL    ALT (SGPT) 355 (H) 16 - 61 U/L    AST (SGOT) 403 (H) 10 - 38 U/L    Alk. phosphatase 445 (H) 45 - 117 U/L    Protein, total 7.5 6.4 - 8.2 g/dL    Albumin 3.0 (L) 3.4 - 5.0 g/dL    Globulin 4.5 (H) 2.0 - 4.0 g/dL    A-G Ratio 0.7 (L) 0.8 - 1.7     TROPONIN I    Collection Time: 10/27/21 11:15 AM   Result Value Ref Range    Troponin-I, QT 0.03 0.0 - 0.045 NG/ML   NT-PRO BNP    Collection Time: 10/27/21 11:15 AM   Result Value Ref Range    NT pro-BNP 8,228 (H) 0 - 1,800 PG/ML   CBC WITH AUTOMATED DIFF    Collection Time: 10/27/21 11:15 AM   Result Value Ref Range    WBC 12.3 4.6 - 13.2 K/uL    RBC 2.07 (L) 4.35 - 5.65 M/uL    HGB 6.2 (L) 13.0 - 16.0 g/dL    HCT 19.5 (L) 36.0 - 48.0 %    MCV 94.2 78.0 - 100.0 FL    MCH 30.0 24.0 - 34.0 PG    MCHC 31.8 31.0 - 37.0 g/dL    RDW 17.4 (H) 11.6 - 14.5 %    PLATELET 049 894 - 584 K/uL    MPV 11.2 9.2 - 11.8 FL    NEUTROPHILS 81 (H) 40 - 73 %    LYMPHOCYTES 8 (L) 21 - 52 %    MONOCYTES 11 (H) 3 - 10 %    EOSINOPHILS 0 0 - 5 %    BASOPHILS 0 0 - 2 %    ABS. NEUTROPHILS 9.9 (H) 1.8 - 8.0 K/UL    ABS. LYMPHOCYTES 1.0 0.9 - 3.6 K/UL    ABS. MONOCYTES 1.4 (H) 0.05 - 1.2 K/UL    ABS. EOSINOPHILS 0.0 0.0 - 0.4 K/UL    ABS.  BASOPHILS 0.0 0.0 - 0.1 K/UL    DF MANUAL      PLATELET COMMENTS ADEQUATE PLATELETS      RBC COMMENTS ANISOCYTOSIS  1+        RBC COMMENTS HYPOCHROMIA  1+       MAGNESIUM    Collection Time: 10/27/21 11:15 AM   Result Value Ref Range    Magnesium 2.2 1.6 - 2.6 mg/dL   RBC, ALLOCATE    Collection Time: 10/27/21 12:15 PM   Result Value Ref Range    HISTORY CHECKED? Historical check performed    TYPE & SCREEN    Collection Time: 10/27/21  1:28 PM   Result Value Ref Range    Crossmatch Expiration 10/30/2021,2359     ABO/Rh(D) A POSITIVE     Antibody screen NEG     CALLED TO: DARION LARES HBVER AT 16:04, BLOOD IN TRANSIT     Unit number [id number]     Blood component type [facility]     Unit division 00     Status of unit ISSUED     Crossmatch result Compatible     Unit number [id number]     Blood component type [facility]     Unit division 00     Status of unit ISSUED     Crossmatch result Compatible    CBC WITH AUTOMATED DIFF    Collection Time: 10/27/21  1:28 PM   Result Value Ref Range    WBC 11.8 4.6 - 13.2 K/uL    RBC 1.93 (L) 4.35 - 5.65 M/uL    HGB 5.7 (LL) 13.0 - 16.0 g/dL    HCT 18.1 (L) 36.0 - 48.0 %    MCV 93.8 78.0 - 100.0 FL    MCH 29.5 24.0 - 34.0 PG    MCHC 31.5 31.0 - 37.0 g/dL    RDW 17.4 (H) 11.6 - 14.5 %    PLATELET 148 426 - 624 K/uL    MPV 11.4 9.2 - 11.8 FL    NEUTROPHILS 71 40 - 73 %    LYMPHOCYTES 10 (L) 21 - 52 %    MONOCYTES 18 (H) 3 - 10 %    EOSINOPHILS 1 0 - 5 %    BASOPHILS 0 0 - 2 %    ABS. NEUTROPHILS 8.4 (H) 1.8 - 8.0 K/UL    ABS. LYMPHOCYTES 1.2 0.9 - 3.6 K/UL    ABS. MONOCYTES 2.1 (H) 0.05 - 1.2 K/UL    ABS. EOSINOPHILS 0.1 0.0 - 0.4 K/UL    ABS.  BASOPHILS 0.0 0.0 - 0.1 K/UL    DF MANUAL      PLATELET COMMENTS ADEQUATE PLATELETS      RBC COMMENTS ANISOCYTOSIS  1+        RBC COMMENTS SPHEROCYTES  1+       METABOLIC PANEL, BASIC    Collection Time: 10/27/21  1:28 PM   Result Value Ref Range    Sodium 136 136 - 145 mmol/L    Potassium 4.1 3.5 - 5.5 mmol/L    Chloride 98 (L) 100 - 111 mmol/L    CO2 29 21 - 32 mmol/L    Anion gap 9 3.0 - 18 mmol/L    Glucose 106 (H) 74 - 99 mg/dL    BUN 47 (H) 7.0 - 18 MG/DL    Creatinine 4.98 (H) 0.6 - 1.3 MG/DL    BUN/Creatinine ratio 9 (L) 12 - 20      GFR est AA 14 (L) >60 ml/min/1.73m2    GFR est non-AA 11 (L) >60 ml/min/1.73m2    Calcium 7.6 (L) 8.5 - 10.1 MG/DL   PROTHROMBIN TIME + INR    Collection Time: 10/27/21  1:28 PM   Result Value Ref Range    Prothrombin time 16.2 (H) 11.5 - 15.2 sec    INR 1.3 (H) 0.8 - 1.2     TROPONIN I    Collection Time: 10/27/21  1:28 PM   Result Value Ref Range    Troponin-I, QT 0.03 0.0 - 0.045 NG/ML       Radiologic Studies -   CTA HEAD NECK W CONT   Final Result      Patent intra- and extracranial cerebral arteries. No evidence of a large vessel   occlusion or high-grade stenosis. MRI BRAIN WO CONT   Final Result         1. Punctate acute lacunar infarct in the dorsal left lentiform nucleus. 2.  Moderate burden of chronic microvascular ischemic disease. CT HEAD WO CONT   Final Result      No acute findings. Moderate sequela of chronic microvascular ischemic disease and mild generalized   volume loss. XR CHEST PORT   Final Result      Enlarged cardiac silhouette. Hazy bilateral airspace opacities. Follow-up can be obtained. CT Results  (Last 48 hours)               10/27/21 1312  CTA HEAD NECK W CONT Final result    Impression:      Patent intra- and extracranial cerebral arteries. No evidence of a large vessel   occlusion or high-grade stenosis. Narrative:  CTA NECK/BRAIN       CLINICAL INDICATIONS: Right leg weakness and dizziness. TECHNIQUE: Helical CT scan of the brain and neck were performed at 1.25 mm   intervals during rapid IV bolus contrast administration. These data were   reconstructed at .625 mm intervals for vascular analysis. The data was also   reviewed at 2.5 mm intervals for accompanying soft tissue analysis.  3D post   processed images, including surface shaded displays, were produced for this exam   on independent console, permanently archived and interpreted. All CT scans at this facility are performed using dose optimization technique as   appropriate to a performed exam, to include automated exposure control,   adjustment of the MA and/or kV according to patient size (including appropriate   matching for site-specific examinations) or use of  iterative reconstruction   technique. CONTRAST: 70 cc Isovue-370 IV       CTA SOFT TISSUE ANALYSIS: No evidence of an evolving major territorial   infarction. Hypoattenuation of the periventricular white matter. Soft tissue   mucosal spaces of the neck are unremarkable. Imaged lung apices are clear. CTA NECK VASCULAR ANALYSIS:        Aortic arch: Normal caliber. Innominate: Patent. Right Subclavian:Patent. Left Subclavian:Patent. Right carotid:   -CCA: Patent. -ECA: Patent. -ICA: Patent. 0% stenosis of proximal ICA by NASCET criteria. Left carotid:   -CCA: Patent. -ECA: Patent. -ICA: Patent. 0% stenosis of proximal ICA by NASCET criteria. Right vertebral: Patent. No significant stenosis. Left vertebral: Patent. No significant stenosis. CTA BRAIN VASCULAR ANALYSIS:        Right anterior circulation:   -ICA:Patent. Mild atherosclerosis but no high-grade stenosis. -PComm: Patent. -VIKA:Patent. -AComm: Normal.   -MCA: Patent. Left anterior circulation:   -ICA:Patent. Mild atherosclerosis but no high-grade stenosis. -PComm: Patent. -VIKA:Patent. -MCA: Patent. Posterior circulation:   -RVA:Patent. -LVA: Patent.   -Basilar: Patent. -Right PCA: Patent.   -Left PCA: Patent. 10/27/21 1156  CT HEAD WO CONT Final result    Impression:      No acute findings. Moderate sequela of chronic microvascular ischemic disease and mild generalized   volume loss. Narrative:  CT HEAD WO CONT       History: Right leg weakness, heaviness, fall on Sunday and Tuesday, dizziness. Comparison: None. TECHNIQUE: 5 mm helical scan obtained of the head were obtained from the skull   vertex through the base of the skull without intravenous contrast.         All CT scans at this facility are performed using dose optimization technique as   appropriate to a performed exam, to include automated exposure control,   adjustment of the mA and/or kV according to patient size (including appropriate   matching first site-specific examinations), or use of iterative reconstruction   technique. BRAIN RESULT:         Gray-white matter differentiation is maintained. There are moderate regions of   periventricular hypodensity. Mild generalized volume loss. No midline shift. Basilar cisterns are patent. No acute intracranial hemorrhage. No hyperdense   MCA. Orbits, soft tissues and osseous structures are grossly normal. Mild sinus   mucosal thickening. Mastoid air cells are patent. CXR Results  (Last 48 hours)               10/27/21 1136  XR CHEST PORT Final result    Impression:      Enlarged cardiac silhouette. Hazy bilateral airspace opacities. Follow-up can be obtained. Narrative:  EXAM: XR CHEST PORT       INDICATION: Weakness       COMPARISON: None. FINDINGS: A portable AP radiograph of the chest was obtained at 1124 hours. The   patient is on a cardiac monitor. Minimal hazy bilateral airspace opacities   primarily at the lung bases. . Enlarged cardiac silhouette. Aortic ectasia. .  No   acute bone findings. Right-sided dual-lumen catheter with tip over the   atriocaval junction. .                  Medical Decision Making     I am the first provider for this patient. Vital Signs- I personally reviewed and interpreted the patient's vital signs.   Patient Vitals for the past 12 hrs:   Temp Pulse Resp BP SpO2   10/27/21 1315  76 21 (!) 130/51 97 %   10/27/21 1313  70 21 134/66 94 %   10/27/21 1312  70 10  97 %   10/27/21 1311    (!) 144/66  10/27/21 1102 97.7 °F (36.5 °C) (!) 42 18 (!) 131/56 100 %       EKG interpretation: Sinus rhythm with sinus arrhythmia. Normal axis. Rate 72. Nonspecific ST/T changes. No STEMI. EKG read and interpreted by ED physician at 1115    Records Reviewed: I reviewed the patient's records to interpret any previous medical data available to me including EKGs, previous medical records, previous images, previous labs. Provider Notes (Medical Decision Making):     79-year-old male with a history of HTN, ESRD on HD TTS, last dialyzed yesterday, presents to the ED for evaluation of intermittent dizziness/off-balance feeling and intermittent right lower extremity weakness that he started noticing Sunday. Currently has no leg weakness but does feel off balance. He has had 2 falls, one yesterday, and one Sunday because of the intermittent right leg weakness. States he feels his leg gives out/feels heavy. Upon my examination, patient is afebrile and overall well appearing. Hemodynamically normal. Neurovascularly intact. Mild asterixis in bilateral upper extremities and unsteady gait. Given today's clinical picture, my differential diagnoses indlude: Anemia, arrhythmia, electrolyte abnormality, hyperammonemia, CVA, TIA, ICH. To further refine my diagnosis, I will order CT brain, labs including cardiac enzymes, EKG, CXR. ED Course:     ED Course as of Oct 27 1630   Wed Oct 27, 2021   1151 Discussed with tele-neurology who reports history is slightly concerning for possible posterior stroke who reports we should do an MRI brain without, and MRA head/neck, and orthostatics. Recommends 325 ASA. Also recommends carotid doppler. Also discussed anemia with him and recommends blood transfusion. Recommends admission for further workup for these things and PT/OT. [EK]   8578 Discussed with hospitalist who does recommend ordering a CTA of the head and neck, states he can have dialysis tomorrow.   Also recommends adding orthostatic vital signs. Patient remained stable throughout their ED course. I discussed relevant findings with patient. My plan is to admit patient to the hospital for further evaluation and management of their condition. Discussed with admitting hospitalist (Dr. Letha Ha) who agrees and accepts patient for admission to their service. Patient verbalized understanding, agrees to plan for admission, and all of their questions were answered. [EK]   [id number] 18 02 19 Discussed with nursing supervisor regarding getting patient over to the main ER as he is requiring blood transfusion and blood has not arrived from a review yet. He is now been here in our ER for over 5-1/2 hours. His last hemoglobin was 5.7. We will also need dialysis tomorrow per his ESRD schedule. They report they will try to expedite his transfer to [facility]. Discussed with blood bank just now, they state they are still preparing it, she estimates another 45 minutes. Will order 1 additional unit as his repeat hemoglobin is 5.7.    [EK]   1521 MRI report is:    1. Punctate acute lacunar infarct in the dorsal left lentiform nucleus. 2.  Moderate burden of chronic microvascular ischemic disease. Discussed with tele-neurology who reports they are aware of the infarct. They report holding ASA still for anemia. Recommend continued current care per previous recommendations and BP goals = permissive HTN (max 160/90). And ASA as soon as it is safe. Recommends NS infusion.     [EK]      ED Course User Index  [EK] Rosendo Carlos DO       Critical Care Time:   39    Critical Care Time:  The services I provided to this patient were to treat and/or prevent clinically significant deterioration that could result in the failure of one or more body systems and/or organ systems due to severe anemia requiring blood transfusion, neurologic deficit concerning for acute stroke and emergent neurology consultation.     Services included the following:  -reviewing nursing notes and old charts  -vital sign assessments  -direct patient care  -medication orders and management  -interpreting and reviewing diagnostic studies/labs  -re-evaluations  -documentation time    Aggregate critical care time was 45 minutes, which includes only time during which I was engaged in work directly related to the patient's care as described above, whether I was at bedside or elsewhere in the Emergency Department. It did not include time spent performing other reported procedures or the services of residents, students, nurses, or advance practice providers.     Ledy Nicole,   Date: 10/27/2021

## 2021-10-27 NOTE — ED NOTES
Report given to Andrew Mandel RN. Receiving RN at [address] [facility] notified of need for transport.

## 2021-10-28 PROBLEM — I63.9 STROKE (CEREBRUM) (HCC): Status: ACTIVE | Noted: 2021-01-01

## 2021-10-28 NOTE — PROGRESS NOTES
Problem: Self Care Deficits Care Plan (Adult)  Goal: *Acute Goals and Plan of Care (Insert Text)  Outcome: Resolved/Met   OCCUPATIONAL THERAPY EVALUATION/DISCHARGE    Patient: Barber Noyola (89 y.o. male)  Date: 10/28/2021  Primary Diagnosis: Ataxia [R27.0]  Weakness [R53.1]  Anemia requiring transfusions [D64.9]  Leg weakness [R29.898]        Precautions:   Fall  PLOF: Pt reports MI to I in ADL and functional ambulation PTA living with wife and granddaughter    ASSESSMENT AND RECOMMENDATIONS:  Based on the objective data described below, the patient presents with prior level of functioning for ADLs as well as [facility] strength, intact balance and endurance. Pt cotreated with PT to maximize participation. Pt sitting in recliner and agreeable to OT session with pt's wife in room. BUE MMT performed with pt in seated position with pt demonstrating WFL strength. Pt demonstrated Mi to doff/chay socks with G sitting balance. Pt performed simulated bathroom mobility with no AD with MI and one small LOB with ability to self correct. Pt and pt's wife did report pt having difficulty stepping into tub shower at home and pt would benefit from tub transfer bench or shower to chair to reduce risk of falls in the bathroom. Pt sitting in arm chair at end of session with all needs in reach. Skilled occupational therapy is not indicated at this time. Discharge Recommendations: home with family  Further Equipment Recommendations for Discharge: shower chair and transfer bench      SUBJECTIVE:   Patient stated i'm doing okay.     OBJECTIVE DATA SUMMARY:     Past Medical History:   Diagnosis Date    Chronic kidney disease     ESRD (end stage renal disease) on dialysis ([facility] [address]     Hepatitis C     Hydrocele     Hypertension     Stroke [facility])      Past Surgical History:   Procedure Laterality Date    HX BUNIONECTOMY Right     HX HERNIA REPAIR Right     HX OTHER SURGICAL      hydrocele surgery     Barriers to Learning/Limitations: None  Compensate with: visual, verbal, tactile, kinesthetic cues/model    Home Situation:   Home Situation  Home Environment: Apartment  # Steps to Enter: 0  One/Two Story Residence: One story  Living Alone: No  Support Systems: Spouse/Significant Other, Other Family Member(s)  Patient Expects to be Discharged to[de-identified] House  Current DME Used/Available at Home: None  Tub or Shower Type: Tub/Shower combination  [x]     Right hand dominant   []     Left hand dominant    Cognitive/Behavioral Status:  Neurologic State: Alert  Orientation Level: Oriented X4  Cognition: Follows commands  Safety/Judgement: Fall prevention  Skin: intact BUe  Edema: none noted BUE  Vision/Perceptual:       WFL     Coordination: BUE  Coordination: Within functional limits  Fine Motor Skills-Upper: Left Intact; Right Intact    Gross Motor Skills-Upper: Left Intact; Right Intact  Balance:  Sitting: Intact  Standing: Intact  Strength: BUE  Strength: Within functional limits      Tone & Sensation: BUE  Tone: Normal  Sensation: Intact      Range of Motion: BUE  AROM: Within functional limits      Functional Mobility and Transfers for ADLs:  Bed Mobility:  Rolling:  (NT pt up in chair )   Transfers:  Sit to Stand: Modified independent  Stand to Sit: Modified independent   ADL Assessment:  Feeding: Independent (based on clinical judgement )  Oral Facial Hygiene/Grooming: Independent (based on clinical judgement )  Bathing: Modified independent (based on clinical judgement )  Upper Body Dressing: Independent (based on clinical judgement )  Lower Body Dressing: Modified independent  Toileting: Modified independent (based on clinical judgement )   ADL Intervention:     Cognitive Retraining  Safety/Judgement: Fall prevention  Pain:  Pain level pre-treatment: 0/10   Pain level post-treatment: 0/10   Pain Intervention(s): Medication (see MAR);  Rest, I Repositioning   Response to intervention: Nurse notified, See doc flow    Activity Tolerance:   good  Please refer to the flowsheet for vital signs taken during this treatment. After treatment:   [x]  Patient left in no apparent distress sitting up in chair  []  Patient left in no apparent distress in bed  [x]  Call bell left within reach  []  Nursing notified  [x]  Caregiver present  []  Bed alarm activated    COMMUNICATION/EDUCATION:   [x]      Role of Occupational Therapy in the acute care setting  [x]      Home safety education was provided and the patient/caregiver indicated understanding. [x]      Patient/family have participated as able and agree with findings and recommendations. []      Patient is unable to participate in plan of care at this time. Thank you for this referral.  Parris Simental, OT  Time Calculation: 11 mins      Eval Complexity: History: LOW Complexity : Brief history review ; Examination: LOW Complexity : 1-3 performance deficits relating to physical, cognitive , or psychosocial skils that result in activity limitations and / or participation restrictions ;    Decision Making:LOW Complexity : No comorbidities that affect functional and no verbal or physical assistance needed to complete eval tasks

## 2021-10-28 NOTE — PROGRESS NOTES
Reason for Admission:  Ataxia [R27.0]  Weakness [R53.1]  Anemia requiring transfusions [D64.9]  Leg weakness [R29.898]                 RUR Score:    17            Plan for utilizing home health:    yes                      Likelihood of Readmission:   Moderate                         Do you (patient/family) have any concerns for transition/discharge?  no    Transition of Care Plan:       Initial assessment completed with patient. Cognitive status of patient: oriented to time, place, person and situation. Face sheet information confirmed:  yes. The patient designates wife to participate in his discharge plan and to receive any needed information. This patient lives in a apartment with patient and wife and granddaughter. Patient is not able to navigate steps as needed. Prior to hospitalization, patient was considered to be independent with ADLs/IADLS : yes . Patient has a current ACP document on file: no      Healthcare Decision Maker:     Click here to complete [employer] including [address] Relationship (ie \"Primary\")    The [facility] will be available to transport patient home upon discharge. The patient already has no medical equipment available in the home. Patient is not currently active with home health. Patient has not stayed in a skilled nursing facility or rehab. This patient is on dialysis yes. [place] [facility]. List of available Home Health agencies were provided and reviewed with the patient prior to discharge. Freedom of choice signed: yes, for any available agency. Currently, the discharge plan is Home with [facility]. The patient states that he can obtain his medications from the pharmacy, and take his medications as directed. Patient's current insurance is Medicare      Care Management Interventions  PCP Verified by CM:  Yes  Mode of Transport at Discharge: Self  Transition of Care Consult (CM Consult): Home Health  Physical Therapy Consult: Yes  Occupational Therapy Consult: Yes  Support Systems: Spouse/Significant Other, Other (Comment) (granddaughter)  Confirm Follow Up Transport: Family  The Plan for Transition of Care is Related to the Following Treatment Goals : Home with home health  The Patient and/or Patient Representative was Provided with a Choice of Provider and Agrees with the Discharge Plan?: Yes  Freedom of Choice List was Provided with Basic Dialogue that Supports the Patient's Individualized Plan of Care/Goals, Treatment Preferences and Shares the Quality Data Associated with the Providers?: Yes  Discharge Location  Discharge Placement: Home with home health    Patient wants to go home, he is worried about his 2 dogs. Patient states he has had the COVID vaccine.     Arlene Lucero RN

## 2021-10-28 NOTE — ED NOTES
[facility] arrived, pt awake alert, not in any acute distress, vital signs remain stable.  2nd Unit of PRBC still infusing

## 2021-10-28 NOTE — PROGRESS NOTES
Problem: Falls - Risk of  Goal: *Absence of Falls  Description: Document Pal[place] Mary Lou Fall Risk and appropriate interventions in the flowsheet.   Outcome: Progressing Towards Goal  Note: Fall Risk Interventions:  Mobility Interventions: Assess mobility with egress test    Medication Interventions: Teach patient to arise slowly       Problem: Patient Education: Go to Patient Education Activity  Goal: Patient/Family Education  Outcome: Progressing Towards Goal

## 2021-10-28 NOTE — CONSULTS
An 79years old male patient with medical history of hypertension and ESRD on hemodialysis [Tuesdays, Thursdays, and Saturdays] was admitted to the hospital for generalized weakness and difficulty maintaining his balance. Started about 5 days ago. On the night of October 24, he was trying to stand up to go to his bedroom after watching TV; he has difficulty standing up and fell down. He was unable to get up and he crawled to his bed. Wife helped him to get on the bed. There is a report of a little bit of confusion at the time. Next day, when he was trying to walk, was feeling off balance but was able to walk without any support. On October 26, he went for his dialysis; was more wobbly after finishing. Had difficulty getting out of his car and fell down. Wife decided to take him to the emergency room yesterday. Patient denied feeling dizzy/lightheaded. Has generalized weakness. Does not have any numbness. Denies any changes in his speech. No headache, nausea, or vomiting. CBC is emergency room showed a hemoglobin of 6.2. CT scan of the brain did not show any acute changes. MRI of the brain showed punctate acute infarct in the dorsal left lentiform nucleus; moderate burden of chronic microvascular ischemic changes seen. CTA of the head and neck was unremarkable. No previous history of stroke. Denied any smoking or alcohol use.       Social History     Socioeconomic History    Marital status:      Spouse name: Not on file    Number of children: Not on file    Years of education: Not on file    Highest education level: Not on file   Occupational History    Not on file   Tobacco Use    Smoking status: Never Smoker    Smokeless tobacco: Never Used   Substance and Sexual Activity    Alcohol use: No    Drug use: No    Sexual activity: Not on file   Other Topics Concern    Not on file   Social History Narrative    Not on file     Social Determinants of Health     Financial Resource Strain:     Difficulty of Paying Living Expenses:    Food Insecurity:     Worried About [address] in the Last Year:     [address] [facility] [address] in the Last Year:    Transportation Needs:     Lack of Transportation (Medical):  Lack of Transportation (Non-Medical):    Physical Activity:     Days of Exercise per Week:     Minutes of Exercise per Session:    Stress:     Feeling of Stress :    Social Connections:     Frequency of Communication with Friends and Family:     Frequency of Social Gatherings with Friends and Family:     Attends [employer] Services:     Active Member of Clubs or Organizations:     Attends Club or Organization Meetings:     Marital Status:    Intimate Partner Violence:     Fear of Current or Ex-Partner:     Emotionally Abused:     Physically Abused:     Sexually Abused:        No family history on file.      Current Facility-Administered Medications   Medication Dose Route Frequency Provider Last Rate Last Admin    aspirin tablet 325 mg  325 mg Oral DAILY Dempsey Boeck, MD   325 mg at 10/28/21 1213    atorvastatin (LIPITOR) tablet 80 mg  80 mg Oral QHS Dempsey Boeck, MD        acetaminophen (TYLENOL) tablet 650 mg  650 mg Oral Q4H PRN Dempsey Boeck, MD        acetaminophen (TYLENOL) suppository 650 mg  650 mg Rectal Q4H PRN Dempsey Boeck, MD        labetaloL (NORMODYNE;TRANDATE) 20 mg/4 mL (5 mg/mL) injection 5 mg  5 mg IntraVENous Q10MIN PRN Dempsey Boeck, MD        [Held by provider] heparin (porcine) injection 5,000 Units  5,000 Units SubCUTAneous Q8H Dempsey Boeck, MD        pantoprazole (PROTONIX) 40 mg in 0.9% sodium chloride 10 mL injection  40 mg IntraVENous Q12H Ruth Ann Yost MD   40 mg at 10/28/21 1213    0.9% sodium chloride infusion 250 mL  250 mL IntraVENous PRN Dempsey Boeck, MD 15 mL/hr at 10/27/21 1756 250 mL at 10/27/21 1756    0.9% sodium chloride infusion 250 mL  250 mL IntraVENous PRN Dempsey Boeck, MD           Past Medical History:   Diagnosis Date    Chronic kidney disease     ESRD (end stage renal disease) [facility] [address]     Hepatitis C     Hydrocele     Hypertension     Stroke [facility])        Past Surgical History:   Procedure Laterality Date    HX BUNIONECTOMY Right     HX HERNIA REPAIR Right     HX OTHER SURGICAL      hydrocele surgery       No Known Allergies    Patient Active Problem List   Diagnosis Code    Ataxia R27.0    Weakness R53.1    Anemia requiring transfusions D64.9    Leg weakness R29.898    Stroke [facility]cerebrum) ([facility] I63.9         Review of Systems:    Constitutional no fever or chills  Skin denies rash or itching  HENT  Denies  hearing lose  Eyes denies diplopia vision lose  Respiratory denies shortness of breath  Cardiovascular denies chest pain  Gastrointestinal denies nausea, vomiting, diarrhea  Genitourinary: on hemodialysis  Musculoskeletal denies joint pain or swelling  Hematology denies easy bruising or bleeding   Neurological as above in HPI      PHYSICAL EXAMINATION:      VITAL SIGNS:    Visit Vitals  BP (!) 169/84   Pulse 66   Temp 98.2 °F (36.8 °C)   Resp 18   Ht 6' (1.829 m)   Wt 83.9 kg (185 lb)   SpO2 99%   BMI 25.09 kg/m²       GENERAL: In no apparent distress. EXTREMITIES: Muscle tone is normal.  HEAD:   The patient is normocephalic. NEUROLOGIC EXAMINATION  Mental status: Awake, alert, oriented x3, follows simple, complex and inverted commands, no neglect, no extinction to DSS or VSS. No asterixis.   Speech and languge: fluent, coherent,  and comprehension intact  CN: VFF, EOMI, PERRLA, face sensation intact , no facial asymmetry noted, palate elevation symmetric bilat, SS+SCM 5/5 bilat, tongue midline  Motor: no pronator drift, tone normal throughout, strength 5/5 throughout  Sensory: intact to light touch throughout  Coordination: FNF, HS accurate w/o dysmetria  DTR: 2+ throughout, toes downgoing BL  Gait: not tested       Result Information    Status: Final result (Exam End: 10/27/2021 13:12) Provider Status: Open   Study Result    Narrative & Impression   CTA NECK/BRAIN     CLINICAL INDICATIONS: Right leg weakness and dizziness.     TECHNIQUE: Helical CT scan of the brain and neck were performed at 1.25 mm  intervals during rapid IV bolus contrast administration. These data were  reconstructed at .625 mm intervals for vascular analysis. The data was also  reviewed at 2.5 mm intervals for accompanying soft tissue analysis. 3D post  processed images, including surface shaded displays, were produced for this exam  on independent console, permanently archived and interpreted.     All CT scans at this facility are performed using dose optimization technique as  appropriate to a performed exam, to include automated exposure control,  adjustment of the MA and/or kV according to patient size (including appropriate  matching for site-specific examinations) or use of  iterative reconstruction  technique.        CONTRAST: 70 cc Isovue-370 IV     CTA SOFT TISSUE ANALYSIS: No evidence of an evolving major territorial  infarction. Hypoattenuation of the periventricular white matter. Soft tissue  mucosal spaces of the neck are unremarkable. Imaged lung apices are clear.     CTA NECK VASCULAR ANALYSIS:      Aortic arch: Normal caliber.     Innominate: Patent.     Right Subclavian:Patent. Left Subclavian:Patent.     Right carotid:  -CCA: Patent. -ECA: Patent. -ICA: Patent. 0% stenosis of proximal ICA by NASCET criteria.     Left carotid:  -CCA: Patent. -ECA: Patent. -ICA: Patent. 0% stenosis of proximal ICA by NASCET criteria.     Right vertebral: Patent. No significant stenosis. Left vertebral: Patent. No significant stenosis.       CTA BRAIN VASCULAR ANALYSIS:      Right anterior circulation:  -ICA:Patent. Mild atherosclerosis but no high-grade stenosis. -PComm: Patent. -VIKA:Patent. -AComm: Normal.  -MCA: Patent.     Left anterior circulation:  -ICA:Patent. Mild atherosclerosis but no high-grade stenosis. -PComm: Patent. -VIKA:Patent.    -MCA: Patent.     Posterior circulation:  -RVA:Patent. -LVA: Patent.  -Basilar: Patent. -Right PCA: Patent.  -Left PCA: Patent.        IMPRESSION     Patent intra- and extracranial cerebral arteries. No evidence of a large vessel  occlusion or high-grade stenosis. Study Result    Narrative & Impression   Brain MR without contrast     HISTORY: Right leg weakness/heaviness and dizziness.     COMPARISON: None     TECHNIQUE: Brain scanned with sagittal and axial T1W scans, axial T2W , axial  FLAIR, axial diffusion weighted images and SWAN.    FINDINGS:      Brain parenchyma: Punctate focus of restricted diffusion in the dorsal left  lentiform nucleus consistent with acute lacunar infarct. No evidence of acute  infarct within the posterior circulation. Moderate burden of T2/FLAIR  hyperintense periventricular white matter abnormality. No mass lesion. No  hemosiderin staining or microbleed.     Brain volumes and ventricular system: Normal in size and morphology for the  patient's age.     Vascular system: Expected arterial flow voids are present at the base of brain.     Calvarium and skull base: Normal.     Paranasal sinuses and mastoid air cells: Trace amount of benign mucosal disease  in the right maxillary antrum. Opacified left posterior ethmoid air cell.     Visualized orbits: Unremarkable for a nondedicated exam.     Visualized upper cervical spine: Unremarkable for a nondedicated exam.     IMPRESSION        1. Punctate acute lacunar infarct in the dorsal left lentiform nucleus.   2.  Moderate burden of chronic microvascular ischemic disease.               CBC:   Lab Results   Component Value Date/Time    WBC 12.7 10/28/2021 10:45 AM    RBC 3.21 (L) 10/28/2021 10:45 AM    HGB 9.5 (L) 10/28/2021 10:45 AM    HCT 30.1 (L) 10/28/2021 10:45 AM    PLATELET 296 50/50/6218 10:45 AM     BMP:   Lab Results   Component Value Date/Time    Glucose 98 10/28/2021 10:45 AM    Sodium 135 (L) 10/28/2021 10:45 AM    Potassium 4.1 10/28/2021 10:45 AM    Chloride 99 (L) 10/28/2021 10:45 AM    CO2 27 10/28/2021 10:45 AM    BUN 61 (H) 10/28/2021 10:45 AM    Creatinine 6.05 (H) 10/28/2021 10:45 AM    Calcium 8.5 10/28/2021 10:45 AM     CMP:   Lab Results   Component Value Date/Time    Glucose 98 10/28/2021 10:45 AM    Sodium 135 (L) 10/28/2021 10:45 AM    Potassium 4.1 10/28/2021 10:45 AM    Chloride 99 (L) 10/28/2021 10:45 AM    CO2 27 10/28/2021 10:45 AM    BUN 61 (H) 10/28/2021 10:45 AM    Creatinine 6.05 (H) 10/28/2021 10:45 AM    Calcium 8.5 10/28/2021 10:45 AM    Anion gap 9 10/28/2021 10:45 AM    BUN/Creatinine ratio 10 (L) 10/28/2021 10:45 AM    Alk. phosphatase 445 (H) 10/27/2021 11:15 AM    Protein, total 7.5 10/27/2021 11:15 AM    Albumin 3.0 (L) 10/27/2021 11:15 AM    Globulin 4.5 (H) 10/27/2021 11:15 AM    A-G Ratio 0.7 (L) 10/27/2021 11:15 AM     Coagulation:   Lab Results   Component Value Date/Time    Prothrombin time 16.2 (H) 10/27/2021 01:28 PM    INR 1.3 (H) 10/27/2021 01:28 PM    aPTT 30.0 09/13/2021 06:13 PM       Impression:   An 79years old male patient with above medical problems was brought to the emergency room for gait difficulty and imbalance. Generalized weakness. No focal neuro deficits. No passing out spells. MRI of the brain showed punctate lesion over the left lentiform nucleus possibly suggesting small infarction. CT angiography of the head and neck unremarkable. His symptoms are difficult to explain with the punctate lesion on his MRI. Generalized weakness and inability to walk could also be from his severe anemia. Lipid panel and hemoglobin A1c unremarkable. Had a transthoracic echo but results pending. Plan:   -Continuous telemetry  -Vitals/Neurochecks q4hrs  -Follow TTE  -C/w   mg Qdaily  -Low dose  Atorvastatin 20 mg PO/day  -Management of the severe anemia per the primary team.  -Call for questions  -Will follow as needed.         PLEASE NOTE:   This document has been produced using voice recognition software. Unrecognized errors in transcription may be present.

## 2021-10-28 NOTE — PROGRESS NOTES
Bedside and Verbal shift change report given to Dya RN (oncoming nurse) by Natalia Decker RN (offgoing nurse). Report included the following information SBAR, Kardex, Intake/Output, MAR and Recent Results.

## 2021-10-28 NOTE — ROUTINE PROCESS
Bedside and Verbal shift change report given to Balwinder Mata RN (oncoming nurse) by Mary Mayer LPN (offgoing nurse). Report included the following information SBAR.

## 2021-10-28 NOTE — PROGRESS NOTES
Pt not seen for skilled PT due to:    []  Nausea/vomiting  []  Eating  []  Pain  []  Pt lethargic  [x]  Off Unit  (21 [phone])   Other: Pt Hgb now up to 9.5 s/p 2 units PRBC will follow up when pt is available. Will f/u later as schedule allows. Thank you.   Hardeep Ramsey, PT, DPT

## 2021-10-28 NOTE — PROGRESS NOTES
Problem: Dysphagia (Adult)  Goal: *Acute Goals and Plan of Care (Insert Text)  Description:   Patient will:  1. Tolerate PO trials with 0 s/s overt distress in 4/5 trials - met  2. Utilize compensatory swallow strategies/maneuvers (decrease bite/sip, size/rate, alt. liq/sol) with min cues in 4/5 trials - met    Rec:     Reg solid with thin liquids  Aspiration precautions  HOB >45 during po intake, remain >30 for 30-45 minutes after po   Small bites/sips; alternate liquid/solid with slow feeding rate   Oral care TID  Meds per pt preference  Outcome: Resolved/Met   SPEECH LANGUAGE PATHOLOGY BEDSIDE SWALLOW EVALUATION/TREATMENT AND DISCHARGE    Patient: Sarai Garcia (46 y.o. male)  Date: 10/28/2021  Primary Diagnosis: Ataxia [R27.0]  Weakness [R53.1]  Anemia requiring transfusions [D64.9]  Leg weakness [R29.898]        Precautions: aspiration     PLOF: As per H&P    ASSESSMENT :  Based on the objective data described below, the patient presents with oropharyngeal swallow fxn essentially WFL. Strength, ROM, and coordination of the orofacial musculature were all found to be [facility]. Pt tolerated reg solid, puree, and thin liquids +/- straw consecutive swallows without any overt s/sx of aspiration. Mastication was appropriate with timely a-p transit. Positive oral clearance observed across all trials. Laryngeal elevation was functional/timely to palpation with all PO trials. Speech production was fluent; no dysarthria observed. Expressive/receptive speech production appeared WFL to informal observation. Pt communicated in sentences of appropriate form, content, and use. Social conversation appropriate. Pt safe for initiation of reg solid diet with thin liquids, aspiration precautions, oral care TID, and meds as tolerated.      TREATMENT :  Skilled therapy initiated; Educated pt on aspiration precautions and importance of compensatory swallow techniques to decrease aspiration risk (decrease rate of intake & sip/bite size, upright @HOB for all po intake and ~30 minutes after po); verbalized comprehension. No further skilled SLP services are indicated at this time. Please re-consult if needed. PLAN :  Recommendations and Planned Interventions: See above  Frequency/Duration: x eval/tx only  Discharge Recommendations: None     SUBJECTIVE:   Patient stated I'm doing great. OBJECTIVE:     Past Medical History:   Diagnosis Date    Chronic kidney disease     ESRD (end stage renal disease) on dialysis ([facility] [address]     Hepatitis C     Hydrocele     Hypertension     Stroke [facility])      Past Surgical History:   Procedure Laterality Date    HX BUNIONECTOMY Right     HX HERNIA REPAIR Right     HX OTHER SURGICAL      hydrocele surgery     Prior Level of Function/Home Situation: see below  Home Situation  Support Systems: Spouse/Significant Other, Other (Comment) (granddaughter)    Diet prior to admission: reg solid with thin  Current Diet:  reg solid with thin      Cognitive and Communication Status:  Neurologic State: Alert  Orientation Level: Oriented X4  Cognition: Follows commands  Oral Assessment:  Oral Assessment  Labial: No impairment  Dentition: Natural;Intact  Oral Hygiene: adequate  Lingual: No impairment  Velum: No impairment  Mandible: No impairment  P.O. Trials:  Patient Position: 90 in chair  Vocal quality prior to P.O.: No impairment  Consistency Presented: Thin liquid; Solid;Puree  How Presented: Self-fed/presented;Cup/sip;Spoon;Straw  Bolus Acceptance: No impairment  Bolus Formation/Control: No impairment  Propulsion: No impairment  Oral Residue: None  Initiation of Swallow: No impairment  Laryngeal Elevation: Functional  Aspiration Signs/Symptoms: None  Pharyngeal Phase Characteristics: No impairment, issues, or problems   Effective Modifications: None  Cues for Modifications: None     Oral Phase Severity: No impairment  Pharyngeal Phase Severity : No impairment    PAIN:  Start of Eval: 0  End of Eval: 0     After treatment:   [] Patient left in no apparent distress sitting up in chair  [x]            Patient left in no apparent distress in bed  [x]            Call bell left within reach  [x]            Nursing notified  []            Family present  []            Caregiver present  []            Bed alarm activated    COMMUNICATION/EDUCATION:   [x]            Aspiration precautions; swallow safety; compensatory techniques. [x]            Patient/family have participated as able in goal setting and plan of care. []            Patient/family agree to work toward stated goals and plan of care. []            Patient understands intent and goals of therapy; neutral about participation. []            Patient unable to participate in goal setting/plan of care; educ ongoing with interdisciplinary staff  [x]         Posted safety precautions in patient's room.     Thank you for this referral.    Shireen Jimenez M.S. CCC-SLP/L  Speech-Language Pathologist

## 2021-10-28 NOTE — PROGRESS NOTES
Problem: Mobility Impaired (Adult and Pediatric)  Goal: *Acute Goals and Plan of Care (Insert Text)  Outcome: Resolved/Met     PHYSICAL THERAPY EVALUATION AND DISCHARGE    Patient: Mauri Bowen (82 y.o. male)  Date: 10/28/2021  Primary Diagnosis: Ataxia [R27.0]  Weakness [R53.1]  Anemia requiring transfusions [D64.9]  Leg weakness [R29.898]        Precautions:      WBAT  PLOF: pt was mod ind PTA, lives with his wife and granddaughter in an apt with 0 GINI    ASSESSMENT :  Based on the objective data described below, the patient presents with baseline functional mobility level at this time, pt received up in chair in NAD, wife present for support. Pt presents with BLE ROM WFL with strength being 5/5 to BLE, light touch sensation is intact. Pt performs sit <> stand with mod ind and amb approx 30 ft within room with no AD. Pt with minor LOB when turning around however able to safely self recover with no external assist, no other evidence of impaired gait or balance. Pt does not require further skilled PT and thus will sign off with no discharge needs noted, pt left up in chair with all needs met. Patient does not require further skilled intervention at this level of care. PLAN :  Recommendations and Planned Interventions:   No formal PT needs identified at this time. Discharge Recommendations: None  Further Equipment Recommendations for Discharge: N/A     SUBJECTIVE:   Patient stated I feel fine.     OBJECTIVE DATA SUMMARY:     Past Medical History:   Diagnosis Date    Chronic kidney disease     ESRD (end stage renal disease) on dialysis ([facility] [address]     Hepatitis C     Hydrocele     Hypertension     Stroke [facility])      Past Surgical History:   Procedure Laterality Date    HX BUNIONECTOMY Right     HX HERNIA REPAIR Right     HX OTHER SURGICAL      hydrocele surgery     Barriers to Learning/Limitations: yes;  physical  Compensate with: Visual Cues, Verbal Cues, and Tactile Cues  Home Situation:   Home Situation  Home Environment: Apartment  # Steps to Enter: 0  One/Two Story Residence: One story  Living Alone: No  Support Systems: Spouse/Significant Other, Other Family Member(s)  Patient Expects to be Discharged to[de-identified] House  Current DME Used/Available at Home: None  Critical Behavior:  Neurologic State: Alert  Orientation Level: Oriented X4  Cognition: Follows commands  Safety/Judgement: Fall prevention  Psychosocial  Patient Behaviors: Calm; Cooperative  Family  Behaviors: Calm  Purposeful Interaction: Yes  Pt Identified Daily Priority: Clinical issues (comment)  Caritas Process: Nurture loving kindness; Teaching/learning; Attend basic human needs  Caring Interventions: Reassure; Therapeutic modalities  Reassure: Therapeutic listening; Acceptance  Therapeutic Modalities: Deep breathing;Humor     Family  Behaviors: Calm  Skin Integrity: Intact;Scars (comment) (R Pelvic)  Skin Integumentary  Skin Integrity: Intact;Scars (comment) (R Pelvic)  Turgor: Non-tenting  Hair Growth: Absent  Varicosities: Absent  Nails: Exceptions to WDL  Exceptions to WDL: Thick     Strength:    Strength: Within functional limits    Tone & Sensation:   Tone: Normal    Sensation: Intact    Range Of Motion:  AROM: Within functional limits    Functional Mobility:  Bed Mobility:  Rolling:  (NT pt up in chair )           Transfers:  Sit to Stand: Modified independent  Stand to Sit: Modified independent          Balance:   Sitting: Intact  Standing: Intact    Ambulation/Gait Training:  Distance (ft): 30 Feet (ft)  Assistive Device:  (none )  Ambulation - Level of Assistance: Supervision       Base of Support: Narrowed     Speed/Eloina: Pace decreased (<100 feet/min)    Pain:  Pain level pre-treatment: 0/10   Pain level post-treatment: 0/10  Pain Intervention(s): Medication (see MAR);  Rest, Ice, Repositioning   Response to intervention: Nurse notified    Activity Tolerance:   Good    Please refer to the flowsheet for vital signs taken during this treatment. After treatment:   [x]         Patient left in no apparent distress sitting up in chair  []         Patient left in no apparent distress in bed  [x]         Call bell left within reach  [x]         Nursing notified  [x]         Caregiver present- wife  []         Bed alarm activated  []         SCDs applied    COMMUNICATION/EDUCATION:   [x]         Role of Physical Therapy in the acute care setting. [x]         Fall prevention education was provided and the patient/caregiver indicated understanding. [x]         Patient/family have participated as able in goal setting and plan of care. [x]         Patient/family agree to work toward stated goals and plan of care. [x]         Patient understands intent and goals of therapy, but is neutral about his/her participation. []         Patient is unable to participate in goal setting/plan of care: ongoing with therapy staff.  []         Other:     Thank you for this referral.  Madison Mcdaniel   Time Calculation: 11 mins      Eval Complexity: History: MEDIUM  Complexity : 1-2 comorbidities / personal factors will impact the outcome/ POC Exam:MEDIUM Complexity : 3 Standardized tests and measures addressing body structure, function, activity limitation and / or participation in recreation  Presentation: MEDIUM Complexity : Evolving with changing characteristics  Clinical Decision Making:Low Complexity    Overall Complexity:MEDIUM

## 2021-10-28 NOTE — ROUTINE PROCESS
OT order received and chart reviewed. Pt noted to have HgB of 5.7 g/dL 10/27. Will wait to engage pt for OT evaluation until labs are updated and pt presents with HgB > 7 g/dL.  Thank you for this referral, Pratibha Adame OTR

## 2021-10-28 NOTE — H&P
Hospitalist Admission Note    NAME: Luis Carlos Rubio   :  1940   MRN:  [id number]     Date:  10/28/2021     Patient PCP: Juan Carlos Solorzano MD  ________________________________________________________________________    My assessment of this patient's clinical condition and my plan of care is as follows. Assessment / Plan:  1. Acute L lacunar CVA  2. Severe anemia status post 2 units PRBCs - stool negative for fecal occult blood  3. ESRD  4. Hypertension  5. Advanced age    3. Admit to medical bed with telemetry, usual stroke orders. 2. Neurology evaluation. 3. Nephrology evaluation for management of hemodialysis while hospitalized. Notified Dr. Tootie Marie by phone, assistance appreciated in advance. 4. 2D echo, thyroid studies, A1c.  5. Monitor H/H, check iron studies. Will consider GI eval if any acute bleeding issues develop. No signs/symptoms of blood loss reported based on history. 6. Resume home meds as appropriate. 7. PT/OT, mobilize as tolerated. 8. Anticipate discharge home with home health care services. Per patient and wife, not interested in nursing placement, even if he qualifies. CM updated - will order [place] [id number] services and await any recommendations on equipment needed. 9. Updated patient and spouse on POC at bedside and son via phone. All questions answered. Code Status: Full  DVT Prophylaxis: Heparin          Subjective:   CHIEF COMPLAINT: Dizziness and weakness    HISTORY OF PRESENT ILLNESS:     Chioma Veloz is a [place] [age] y.o.  male who presented to [facility] for evaluation of lower extremity weakness and dizziness. Per wife, symptoms began  evening and patient sustained a fall at home on  night as a result. He continued to have issues into Monday but did not seek any medical attention until after he went to dialysis on Tuesday and much more weak than usual following his HD session.   He subsequently presented to [facility] emergency department for evaluation. CT scan of the head was negative but MRI showed a left lacunar infarct. Patient was referred for transfer to [facility] for further evaluation and treatment. Patient arrived to [facility] around 930 last night per nursing report but, unfortunately, the hospitalist service was not notified until around 8 AM this morning. Past Medical History:   Diagnosis Date    Chronic kidney disease     ESRD (end stage renal disease) on dialysis ([facility] [address]     Hepatitis C     Hydrocele     Hypertension     Stroke [facility])         Past Surgical History:   Procedure Laterality Date    HX BUNIONECTOMY Right     HX HERNIA REPAIR Right     HX OTHER SURGICAL      hydrocele surgery       Social History     Tobacco Use    Smoking status: Never Smoker    Smokeless tobacco: Never Used   Substance Use Topics    Alcohol use: No        No family history on file. No Known Allergies     Prior to Admission medications    Medication Sig Start Date End Date Taking? Authorizing Provider   sildenafil citrate (VIAGRA) 100 mg tablet take 1/2 to 1 tablet by mouth 1 HOUR PRIOR TO SEXUAL ACTIVITY - DO NOT EXCEED 1 TABLET IN 24 HOURS 12/18/20  Yes Provider, Historical   elbasvir-grazoprevir (Zepatier)  mg tab Take 1 Tablet by mouth daily. Provider, Historical   tamsulosin (FLOMAX) 0.4 mg capsule Take 1 Capsule by mouth nightly. Provider, Historical   losartan (COZAAR) 100 mg tablet Take 50 mg by mouth daily. 3/1/21   Other, MD Guerrero   nebivolol (BYSTOLIC) 10 mg tablet Take 10 mg by mouth daily. Provider, Historical   amLODIPine (NORVASC) 5 mg tablet Take 5 mg by mouth daily.     Provider, Historical       REVIEW OF SYSTEMS:     Total of 12 systems reviewed as follows:       POSITIVE= bolded text  Negative = text not underlined  General:  fever, chills, sweats, generalized weakness, weight loss/gain,      loss of appetite, malaise  Eyes:    blurred vision, eye pain, loss of vision, double vision  ENT:    rhinorrhea, pharyngitis Respiratory:   cough, sputum production, SOB, GUERRA, wheezing, pleuritic pain   Cardiology:   chest pain, palpitations, orthopnea, PND, edema, syncope   Gastrointestinal:  abdominal pain , N/V, diarrhea, dysphagia, constipation, bleeding   Genitourinary:  frequency, urgency, dysuria, hematuria, incontinence   Muskuloskeletal :  arthralgia, myalgia, back pain  Hematology:  easy bruising, nose or gum bleeding, lymphadenopathy   Dermatological: rash, ulceration, pruritis, color change / jaundice  Endocrine:   hot flashes or polydipsia   Neurological:  headache, dizziness, confusion, focal weakness, paresthesia,     Speech difficulties, memory loss, gait difficulty  Psychological: Feelings of anxiety, depression, agitation    Objective:   VITALS:    Visit Vitals  BP (!) 169/84   Pulse 66   Temp 98.2 °F (36.8 °C)   Resp 18   Ht 6' (1.829 m)   Wt 83.9 kg (185 lb)   SpO2 99%   BMI 25.09 kg/m²       PHYSICAL EXAM:    General:    In NAD. Nontoxic-appearing. HEENT: Head NCAT. Pupils equal round sclerae anicteric, EOMI. Neck:  Supple, trachea midline. Lungs:   Clear, no wheezes. Effort nonlabored, no accessory muscle use. Chest wall:  No chest wall tenderness. Chest expansion equal and symmetrical bilaterally. Heart:   RRR. Abdomen:   Soft, NT/ND. Bowel sounds normoactive. Extremities: Warm, no edema. No evidence for ischemia. Skin:     Not pale. Not Jaundiced  No rashes   Psych:  Mood normal.  Calm, no agitation. Neurologic: Awake and alert, moves extremities spontaneously.       _______________________________________________________________________  Care Plan discussed with:    Comments   Patient X    Family  X    RN X    Care Manager X                   Consultant:  X    _______________________________________________________________________  Expected  Disposition:   Home     HH/PT/OT/RN X   SNF/LTC    ARU    ________________________________________________________________________    Tests/Procedures: MRI brain:  IMPRESSION        1. Punctate acute lacunar infarct in the dorsal left lentiform nucleus. 2.  Moderate burden of chronic microvascular ischemic disease. CT head:  IMPRESSION     No acute findings. Moderate sequela of chronic microvascular ischemic disease and mild generalized  volume loss. CTA head/neck:  IMPRESSION     Patent intra- and extracranial cerebral arteries. No evidence of a large vessel  occlusion or high-grade stenosis. CXR:  IMPRESSION     Enlarged cardiac silhouette.     Hazy bilateral airspace opacities. Follow-up can be obtained. LAB DATA REVIEWED:    Recent Results (from the past 24 hour(s))   RBC, ALLOCATE    Collection Time: 10/27/21 12:15 PM   Result Value Ref Range    HISTORY CHECKED? Historical check performed    TYPE & SCREEN    Collection Time: 10/27/21  1:28 PM   Result Value Ref Range    Crossmatch Expiration 10/30/2021,2359     ABO/Rh(D) A POSITIVE     Antibody screen NEG     CALLED TO: DARION LARES HBVER AT 16:04, BLOOD IN TRANSIT     Unit number [id number]     Blood component type [facility]     Unit division 00     Status of unit TRANSFUSED     Crossmatch result Compatible     Unit number [id number]     Blood component type [facility]     Unit division 00     Status of unit TRANSFUSED     Crossmatch result Compatible    CBC WITH AUTOMATED DIFF    Collection Time: 10/27/21  1:28 PM   Result Value Ref Range    WBC 11.8 4.6 - 13.2 K/uL    RBC 1.93 (L) 4.35 - 5.65 M/uL    HGB 5.7 (LL) 13.0 - 16.0 g/dL    HCT 18.1 (L) 36.0 - 48.0 %    MCV 93.8 78.0 - 100.0 FL    MCH 29.5 24.0 - 34.0 PG    MCHC 31.5 31.0 - 37.0 g/dL    RDW 17.4 (H) 11.6 - 14.5 %    PLATELET 877 839 - 833 K/uL    MPV 11.4 9.2 - 11.8 FL    NEUTROPHILS 71 40 - 73 %    LYMPHOCYTES 10 (L) 21 - 52 %    MONOCYTES 18 (H) 3 - 10 %    EOSINOPHILS 1 0 - 5 %    BASOPHILS 0 0 - 2 %    ABS. NEUTROPHILS 8.4 (H) 1.8 - 8.0 K/UL    ABS. LYMPHOCYTES 1.2 0.9 - 3.6 K/UL    ABS.  MONOCYTES 2.1 (H) 0.05 - 1.2 K/UL ABS. EOSINOPHILS 0.1 0.0 - 0.4 K/UL    ABS. BASOPHILS 0.0 0.0 - 0.1 K/UL    DF MANUAL      PLATELET COMMENTS ADEQUATE PLATELETS      RBC COMMENTS ANISOCYTOSIS  1+        RBC COMMENTS SPHEROCYTES  1+       METABOLIC PANEL, BASIC    Collection Time: 10/27/21  1:28 PM   Result Value Ref Range    Sodium 136 136 - 145 mmol/L    Potassium 4.1 3.5 - 5.5 mmol/L    Chloride 98 (L) 100 - 111 mmol/L    CO2 29 21 - 32 mmol/L    Anion gap 9 3.0 - 18 mmol/L    Glucose 106 (H) 74 - 99 mg/dL    BUN 47 (H) 7.0 - 18 MG/DL    Creatinine 4.98 (H) 0.6 - 1.3 MG/DL    BUN/Creatinine ratio 9 (L) 12 - 20      GFR est AA 14 (L) >60 ml/min/1.73m2    GFR est non-AA 11 (L) >60 ml/min/1.73m2    Calcium 7.6 (L) 8.5 - 10.1 MG/DL   PROTHROMBIN TIME + INR    Collection Time: 10/27/21  1:28 PM   Result Value Ref Range    Prothrombin time 16.2 (H) 11.5 - 15.2 sec    INR 1.3 (H) 0.8 - 1.2     TROPONIN I    Collection Time: 10/27/21  1:28 PM   Result Value Ref Range    Troponin-I, QT 0.03 0.0 - 0.045 NG/ML   LIPID PANEL    Collection Time: 10/27/21  1:28 PM   Result Value Ref Range    LIPID PROFILE          Cholesterol, total 157 <200 MG/DL    Triglyceride 79 <150 MG/DL    HDL Cholesterol 60 40 - 60 MG/DL    LDL, calculated 81.2 0 - 100 MG/DL    VLDL, calculated 15.8 MG/DL    CHOL/HDL Ratio 2.6 0 - 5.0     HEMOGLOBIN A1C WITH EAG    Collection Time: 10/27/21  1:28 PM   Result Value Ref Range    Hemoglobin A1c <3.8 (L) 4.2 - 5.6 %    Est. average glucose Cannot be calculated mg/dL   AMMONIA    Collection Time: 10/27/21  3:45 PM   Result Value Ref Range    Ammonia 22 11 - 32 UMOL/L   RBC, ALLOCATE    Collection Time: 10/27/21  9:00 PM   Result Value Ref Range    HISTORY CHECKED?  Historical check performed    GLUCOSE, POC    Collection Time: 10/27/21  9:37 PM   Result Value Ref Range    Glucose (POC) 156 (H) 70 - 110 mg/dL   GLUCOSE, POC    Collection Time: 10/28/21  6:26 AM   Result Value Ref Range    Glucose (POC) 105 70 - 110 mg/dL   DRUG SCREEN, URINE    Collection Time: 10/28/21  8:00 AM   Result Value Ref Range    BENZODIAZEPINES Negative NEG      BARBITURATES Negative NEG      THC (TH-CANNABINOL) Negative NEG      OPIATES Negative NEG      PCP(PHENCYCLIDINE) Negative NEG      COCAINE Negative NEG      AMPHETAMINES Negative NEG      METHADONE Negative NEG      HDSCOM (NOTE)    LIPID PANEL    Collection Time: 10/28/21 10:45 AM   Result Value Ref Range    LIPID PROFILE          Cholesterol, total 168 <200 MG/DL    Triglyceride 106 <150 MG/DL    HDL Cholesterol 57 40 - 60 MG/DL    LDL, calculated 89.8 0 - 100 MG/DL    VLDL, calculated 21.2 MG/DL    CHOL/HDL Ratio 2.9 0 - 5.0     CBC W/O DIFF    Collection Time: 10/28/21 10:45 AM   Result Value Ref Range    WBC 12.7 4.6 - 13.2 K/uL    RBC 3.21 (L) 4.35 - 5.65 M/uL    HGB 9.5 (L) 13.0 - 16.0 g/dL    HCT 30.1 (L) 36.0 - 48.0 %    MCV 93.8 78.0 - 100.0 FL    MCH 29.6 24.0 - 34.0 PG    MCHC 31.6 31.0 - 37.0 g/dL    RDW 16.9 (H) 11.6 - 14.5 %    PLATELET 591 949 - 664 K/uL    MPV 10.8 9.2 - 11.8 FL   TSH 3RD GENERATION    Collection Time: 10/28/21 10:45 AM   Result Value Ref Range    TSH 3.61 0.36 - 6.08 uIU/mL   METABOLIC PANEL, BASIC    Collection Time: 10/28/21 10:45 AM   Result Value Ref Range    Sodium 135 (L) 136 - 145 mmol/L    Potassium 4.1 3.5 - 5.5 mmol/L    Chloride 99 (L) 100 - 111 mmol/L    CO2 27 21 - 32 mmol/L    Anion gap 9 3.0 - 18 mmol/L    Glucose 98 74 - 99 mg/dL    BUN 61 (H) 7.0 - 18 MG/DL    Creatinine 6.05 (H) 0.6 - 1.3 MG/DL    BUN/Creatinine ratio 10 (L) 12 - 20      GFR est AA 11 (L) >60 ml/min/1.73m2    GFR est non-AA 9 (L) >60 ml/min/1.73m2    Calcium 8.5 8.5 - 10.1 MG/DL   ECHO ADULT COMPLETE    Collection Time: 10/28/21 11:55 AM   Result Value Ref Range    IVSd 1.35 (A) 0.60 - 1.00 cm    LVIDd 4.70 4.20 - 5.90 cm    LVIDs 3.53 cm    LVOT d 2.23 cm    LVPWd 1.31 (A) 0.60 - 1.00 cm    LVOT Cardiac Output 5.23 liter/minute    LVOT Peak Gradient 3.17 mmHg    Left Ventricular Outflow Tract Mean Gradient 1.68 mmHg    LVOT SV 75.4 mL    LVOT Peak Velocity 89.01 cm/s    LVOT VTI 19.25 cm    MV A Garry 69.80 cm/s    Mitral Valve E Wave Deceleration Time 287.37 ms    MV E Garry 91.27 cm/s    LV E' Lateral Velocity 8.93 cm/s    Tapse 2.29 (A) 1.50 - 2.00 cm    Triscuspid Valve Regurgitation Peak Gradient 22.15 mmHg    TR Max Velocity 234.62 cm/s    AO ASC D 3.93 cm    Ao Root D 4.11 cm       Atul Olivarez MD  [address] [place] [address]      Disclaimer: Sections of this note are dictated utilizing voice recognition software and minor typographical errors may be present. If questions arise, please do not hesitate to contact me or call our department.

## 2021-10-28 NOTE — PROGRESS NOTES
ARU/IPR REFERRAL CONTACT NOTE  [address] [place] [address] for Physical Rehabilitation    RE: Wallace Alba     Thank you for the opportunity to review this patient's case for admission to [address] [place] [address] for Physical Rehabilitation. Based on our pre-admission screening:     [x) This patient does not meet criteria for admission to [facility] for Physical Rehabilitation due to:    [x ] Too functional, per documentation, patient does not require both Physical and Occupational Therapy Services at an acute rehabilitation level of intensity. Again, Thank you for this referral. Should you have any questions please do not hesitate to call. Sincerely,  Gayle Tillman. Kiara Graves, [address]  Kiara Graves, RN  Admissions [facility] for Physical Rehabilitation  [phone]

## 2021-10-29 NOTE — PROGRESS NOTES
D/C orders received. Orders for DME, clinicals faxed to [facility]. No additional needs identified by . Chart reviewed. Pt will be transported home by family after dialysis today.  available as needed.     Leslie Phillips RN

## 2021-10-29 NOTE — PROGRESS NOTES
Elevated B/P observed before discharge; unit manager notified; provider contacted. New order for one time dose  Hydralyzine 25mg PO. No s/s of distress. Respirations even/unlabored.

## 2021-10-29 NOTE — DIALYSIS
SHABNAM        ACUTE HEMODIALYSIS FLOW SHEET      HEMODIALYSIS ORDERS: Physician: Manuela     Dialyzer: revaclear   Duration: 3 hr  BFR: 300   DFR: 500   Dialysate:  Temp 36-37*C  K+   2    Ca+  2.5 Na 140 Bicarb 30   Weight:  83.9 kg    Patient Chart [x]     Unable to Obtain []   Dry weight/UF Goal: 2000   Access: right chest TDC    Heparin []  Bolus      Units    [] Hourly       Units    [x]None      Catheter locking solution: heparin    Pre BP:   180/86    Pulse:     68       Respirations: 18  Temperature:   97.6   Labs: Pre        Post:         [x] N/A   Additional Orders(medications, blood products, hypotension management):       [x] N/A     [x] Shabnam Consent Verified     CATHETER ACCESS: []N/A   [x]Right chest   []Left   []IJ     []Fem   []transhepatic   [] First use X-ray verified     [x]Permanent                [] Temporary   [x]No S/S infection  []Redness  []Drainage []Cultured []Swelling []Pain   [x]Medical Aseptic Prep Utilized   [x]Dressing Changed  [x] Biopatch  Date: 10/29/       []Clotted   [x]Patent   Flows: [x]Good  []Poor  []Reversed   If access problem,  notified: []Yes    [x]N/A            GRAFT/FISTULA ACCESS:  [x]N/A                             GENERAL ASSESSMENT:      LUNGS:  Rate 18    SaO2%        [] N/A    [x] Clear  [] Coarse  [] Crackles  [] Wheezing        [] Diminished     Location : []RLL   []LLL    []RUL  []PETTY     Cough: []Productive  []Dry  [x]N/A   Respirations:  [x]Easy  []Labored     Therapy:   [x]RA  []NC  l/min    Mask: []NRB []Venti       O2%                  []Ventilator  []Intubated  [] Trach  [] BiPaP     CARDIAC: [x]Regular      [] Irregular   [] Pericardial Rub  [] JVD        []  Monitored  [] Bedside  [] Remotely monitored [x] N/A      EDEMA: [] None   [x]Generalized  [] Pitting [] 1    [] 2    [] 3    [] 4                 [] Facial  [] Pedal  []  UE  [] LE     SKIN:   [x] Warm   [] Hot     [] Cold   [x] Dry     [] Pale   [] Diaphoretic                  [] Flushed  [] Jaundiced  [] Cyanotic  [] Rash  [] Weeping     LOC:    [x] Alert      [x]Oriented:    [x] Person     [x] Place  [x]Time               [] Confused  [] Lethargic  [] Medicated  [] Non-responsive     GI / ABDOMEN:  [] Flat    [] Distended    [x] Soft    [] Firm   []  Obese                             [] Diarrhea  [x] Bowel Sounds  [] Nausea  [] Vomiting       / URINE ASSESSMENT:[] Voiding   [x] Oliguria  [] Anuria   []  Knowles     [] Incontinent    []  Incontinent Brief      []  Bathroom Privileges       PAIN: [x] 0 []1  []2   []3   []4   []5   []6   []7   []8   []9   []10              Scale 0-10  Action/Follow Up:      MOBILITY:  [] Amb    [] Amb/Assist    [x] Bed    [] Wheelchair  [] Stretcher      All Vitals and Treatment Details on Attached [address]: [facility]          Room # 368/16      [] 1st Time Acute  [] Stat  [x] Routine  [] Urgent     [x] Acute Room  []  Bedside  [] ICU/CCU  [] ER   Isolation Precautions:   There are currently no Active Isolations      Special Considerations:         [] Blood Consent Verified [x]N/A      ALLERGIES:   No Known Allergies            Code Status:Full Code        Hepatitis Status:                       Lab Results   Component Value Date/Time    Hepatitis B surface Ag <0.10 10/28/2021 10:45 AM    Hepatitis B surface Ab <3.10 (L) 10/28/2021 10:45 AM                     Current Labs:   Lab Results   Component Value Date/Time    Sodium 135 (L) 10/28/2021 10:45 AM    Potassium 4.1 10/28/2021 10:45 AM    Chloride 99 (L) 10/28/2021 10:45 AM    CO2 27 10/28/2021 10:45 AM    Anion gap 9 10/28/2021 10:45 AM    Glucose 98 10/28/2021 10:45 AM    BUN 61 (H) 10/28/2021 10:45 AM    Creatinine 6.05 (H) 10/28/2021 10:45 AM    BUN/Creatinine ratio 10 (L) 10/28/2021 10:45 AM    GFR est AA 11 (L) 10/28/2021 10:45 AM    GFR est non-AA 9 (L) 10/28/2021 10:45 AM    Calcium 8.0 (L) 10/28/2021 04:30 PM      Lab Results   Component Value Date/Time    WBC 12.7 10/28/2021 10:45 AM    HGB 9.8 (L) 10/29/2021 11:05 AM    HCT 30.5 (L) 10/29/2021 11:05 AM    PLATELET 092 99/01/5183 10:45 AM    MCV 93.8 10/28/2021 10:45 AM                                                                                     DIET:   DIET ADULT       PRIMARY NURSE REPORT: First initial/Last name/Title      Pre Dialysis: Ben Wilkins LPN     Time: 9903      EDUCATION:    [x] Patient [] Other         Knowledge Basis: []None [x]Minimal [] Substantial   Barriers to learning  [x]N/A   [] Access Care     [] S&S of infection     [] Fluid Management     []K+     [x]Procedural    []Albumin     [] Medications     [] Tx Options     [] Transplant     [] Diet     [] Other   Teaching Tools:  [x] Explain  [x] Demo  [] Handouts [] Video  Patient response:  [x] Verbalized understanding  [] Teach back  [] Return demonstration [] Requires follow up   Inappropriate due to:            [x]Time Out/Safety Check  [x]Extracorporeal Circuit Tested for integrity       RO/HEMODIALYSIS MACHINE SAFETY CHECKS  Before each treatment:     Machine Number:                   [facility]                                  [x] Unit Machine # 5 with centralized RO                                      Alarm Test:  Pass time 1213               [x] RO/Machine Log Complete      Temp   36             Dialysate: pH  7.4 Conductivity: Meter   13.8     HD Machine   13.7                  TCD: 13.8  Dialyzer Lot # [id number]            Blood Tubing Lot # [id number]          Saline Lot #  [id number]     CHLORINE TESTING-Before each treatment and every 4 hours    Total Chlorine: [x] less than 0.1 ppm  Time: 0900 4 Hr/2nd Check Time: 1300   (if greater than 0.1 ppm from Primary then every 30 minutes from Secondary)     TREATMENT INITIATION  with Dialysis Precautions:   [x] All Connections Secured                 [x] Saline Line Double Clamped   [x] Venous Parameters Set                  [x] Arterial Parameters Set    [x] Prime Given 250ml                          [x]Air Foam Detector Engaged      Treatment Initiation Note:   Pt arrived to HD unit via bed. A&Ox4 in NAD on RA. Right chest TDC assessed with no s/s complications. HD initiated without difficulty. During Treatment Notes:  1300 Pt awake and alert. Face and access in view with connections secure. 1315 Pt awake and alert. Face and access in view with connections secure. 1330 Pt awake and alert. Face and access in view with connections secure. 1345 Pt awake and alert. Face and access in view with connections secure. 1400 Pt awake and alert. Face and access in view with connections secure. 1415 Pt awake and alert. Face and access in view with connections secure. 1430 Pt awake and alert. Face and access in view with connections secure. 1445 Pt awake and alert. Face and access in view with connections secure. 1500 Pt awake and alert. Face and access in view with connections secure. 1515 Pt awake and alert. Face and access in view with connections secure. 1530 Pt awake and alert. Face and access in view with connections secure. 1545 Pt awake and alert. Face and access in view with connections secure. Medication Dose Volume Route Time [facility] name Title   none     ZOYA Garcia RN                   Post Assessment:   Dialyzer Cleared: [] Good [x] Fair  [] Poor  Blood processed:  63.4 L  UF Removed  2500 mL  POst BP:   188/94       Pulse: 73        Respirations: 18  Temperature: 97.7 Lungs:     [x] Clear      [] Course         [] Crackles    [] Wheezing         [] Diminished   Post Tx Vascular Access:      N/A Cardiac:   [x] Regular   [] Irregular   [] Monitor  [x] N/A       Catheter:   Locking solution: Heparin 1:1000   Art. 1.6  [place]. 1.6      Skin:   Pain:   [x] Warm  [x] Dry [] Diaphoretic    [] Flushed    [] Pale [] Cyanotic [x]0  []1  []2   []3  []4   []5   []6   []7   []8   []9   []10     Post Treatment Note:  Pt tolerated treatment well. Net 2 L UF removed.      POST TREATMENT PRIMARY NURSE HANDOFF REPORT:     First initial/Last name/Title         Post Dialysis: Michaelle Torres LPN     Time:  1965     Abbreviations: AVG-arterial venous graft, AVF-arterial venous fistula, IJ-Internal Jugular, Subcl-Subclavian, Fem-Femoral, Tx-treatment, AP/HR-apical heart rate, DFR-dialysate flow rate, BFR-blood flow rate, AP-arterial pressure, -venous pressure, UF-ultrafiltrate, TMP-transmembrane pressure, [place]-Venous, Art-Arterial, RO-Reverse Osmosis

## 2021-10-29 NOTE — PROGRESS NOTES
Bedside and Verbal shift change report given to Deisy Meeks RN (oncoming nurse) by Radha Ruth RN (offgoing nurse). Report included the following information SBAR, Kardex, Intake/Output and MAR.

## 2021-10-29 NOTE — DISCHARGE INSTRUCTIONS
Patient armband removed and shredded    MyChart Activation    Thank you for requesting access to [facility]. Please follow the instructions below to securely access and download your online medical record. [facility] allows you to send messages to your doctor, view your test results, renew your prescriptions, schedule appointments, and more. How Do I Sign Up? 1. In your internet browser, go to [email or web address]  2. Click on the First Time User? Click Here link in the Sign In box. You will be redirect to the New Member Sign Up page. 3. Enter your [facility] Access Code exactly as it appears below. You will not need to use this code after youve completed the sign-up process. If you do not sign up before the expiration date, you must request a new code. [facility] Access Code: [email or web address]  Expires: 2021  6:57 AM (This is the date your [facility] access code will )    4. Enter the last four digits of your Social Security Number (xxxx) and Date of Birth (mm/dd/yyyy) as indicated and click Submit. You will be taken to the next sign-up page. 5. Create a [facility] ID. This will be your [facility] login ID and cannot be changed, so think of one that is secure and easy to remember. 6. Create a [facility] password. You can change your password at any time. 7. Enter your Password Reset Question and Answer. This can be used at a later time if you forget your password. 8. Enter your e-mail address. You will receive e-mail notification when new information is available in [address]. 9. Click Sign Up. You can now view and download portions of your medical record. 10. Click the Download Summary menu link to download a portable copy of your medical information. Additional Information    If you have questions, please visit the Frequently Asked Questions section of the [facility] website at [email or web address] Remember, [facility] is NOT to be used for urgent needs.  For medical emergencies, dial 911.      I have reviewed discharge instructions with the patient. The patient verbalized understanding. Advance Medical Directive Consult      Consulted to provide expertise on Advance Medical Directives, including Living Will and Durable Power of  for Healthcare. Reviewed chart and/or spoke with nurse to ensure that patient is sufficiently alert and oriented to proceed with consult. Reviewed information with patient. Patient articulated yes. Patient did not complete an Advance Medical Directive. - Original returned to patient and copy provided to unit staff to have scanned into the medical record. - Contact information provided in the event patient has further questions.

## 2021-10-29 NOTE — PROGRESS NOTES
Bedside and Verbal shift change report given to vinnie VALENCIA  (oncoming nurse) by Brittani Chew RN  (offgoing nurse). Report included the following information SBAR, Kardex, Intake/Output, MAR and Recent Results.

## 2021-10-29 NOTE — PROGRESS NOTES
Consult Note         Consult requested by: Marleni Charlton MD    ADMIT DATE: 10/27/2021    CONSULT DATE: October 29, 2021           Admission diagnosis: severe Anemia , dizzyness  Reason for Nephrology Consultation: ESRD    A/P:  1) ESRD on HD TTS under my care at  [address]  2) Generalized weakness and inability to walk/dizzyness  3) Acute on chronic Anemia , Anemia of CKD (Hb was 9.1 on 10/21 outpatient  , was down to 5 range)       No evidence of blood loss   4) uncontrolled HTN  5) sec hyperpara     Plan:  1) HD today for volume and solute. 2) received PRBC , monitor Hct. 3) BP parameters per neurology in acute stroke setting, labetalol PRN       Restart BP meds on discharge. 4) iron and CHARO outpatient. 5) patient wants to be transferred to [facility] dialysis unit     Please call with questions,     Tori Daniel MD FASN  Cell [phone]  Pager: [phone]       HPI:   Consultation noted for ESRD patient on HD TTS , under mycare at [facility] . Patient is a 26-year-old male was brought into ED accompanied by family for evaluation of intermittent dizziness and right lower extremity weakness since Sunday.  Patient has had 2 falls recently. One fall on Sunday when he felt his right leg become heavy, no head injury or LOC. Additionally had another fall yesterday per family. Patient does not state he has any right leg weakness at this time however feels a little unsteady on his feet when walking, again symptoms have been ongoing since Sunday. Denies any headache, blurry or double vision, room spinning sensation, hearing loss, chest pain, dyspnea, presyncope, syncope, abdominal pain, back pain, neck pain. Last dialysis was on Tuesday Oncology presentation patient was afeb , uncontrolled HTN , no hypoxia , labs showed significant acute drop in HB to 5 range from baseline of 9 recently ( Hb was 9,1 on 10/21) ,  Electrolytes and acid base were acceptable .  MRI of the brain showed punctate lesion over the left lentiform nucleus. CT angiography of the head and neck unremarkable. Neurology consulted       Past Medical History:   Diagnosis Date    Chronic kidney disease     ESRD (end stage renal disease) on dialysis ([facility] [address]     Hepatitis C     Hydrocele     Hypertension     Stroke [facility])       Past Surgical History:   Procedure Laterality Date    HX BUNIONECTOMY Right     HX HERNIA REPAIR Right     HX OTHER SURGICAL      hydrocele surgery       Social History     Socioeconomic History    Marital status:      Spouse name: Not on file    Number of children: Not on file    Years of education: Not on file    Highest education level: Not on file   Occupational History    Not on file   Tobacco Use    Smoking status: Never Smoker    Smokeless tobacco: Never Used   Substance and Sexual Activity    Alcohol use: No    Drug use: No    Sexual activity: Not on file   Other Topics Concern    Not on file   Social History Narrative    Not on file     Social Determinants of Health     Financial Resource Strain:     Difficulty of Paying Living Expenses:    Food Insecurity:     Worried About Running Out of Food in the Last Year:     Ran Out of Food in the Last Year:    Transportation Needs:     Lack of Transportation (Medical):  Lack of Transportation (Non-Medical):    Physical Activity:     Days of Exercise per Week:     Minutes of Exercise per Session:    Stress:     Feeling of Stress :    Social Connections:     Frequency of Communication with Friends and Family:     Frequency of Social Gatherings with Friends and Family:     Attends Jew Services:     Active Member of Clubs or Organizations:     Attends Club or Organization Meetings:     Marital Status:    Intimate Partner Violence:     Fear of Current or Ex-Partner:     Emotionally Abused:     Physically Abused:     Sexually Abused:        No family history on file.   No Known Allergies     Home Medications:     Medications Prior to Admission   Medication Sig    sildenafil citrate (VIAGRA) 100 mg tablet take 1/2 to 1 tablet by mouth 1 HOUR PRIOR TO SEXUAL ACTIVITY - DO NOT EXCEED 1 TABLET IN 24 HOURS    elbasvir-grazoprevir (Zepatier)  mg tab Take 1 Tablet by mouth daily.  tamsulosin (FLOMAX) 0.4 mg capsule Take 1 Capsule by mouth nightly.  losartan (COZAAR) 100 mg tablet Take 50 mg by mouth daily.  nebivolol (BYSTOLIC) 10 mg tablet Take 10 mg by mouth daily.  amLODIPine (NORVASC) 5 mg tablet Take 5 mg by mouth daily. Current Inpatient Medications:     Current Facility-Administered Medications   Medication Dose Route Frequency    aspirin tablet 325 mg  325 mg Oral DAILY    acetaminophen (TYLENOL) tablet 650 mg  650 mg Oral Q4H PRN    acetaminophen (TYLENOL) suppository 650 mg  650 mg Rectal Q4H PRN    labetaloL (NORMODYNE;TRANDATE) 20 mg/4 mL (5 mg/mL) injection 5 mg  5 mg IntraVENous Q10MIN PRN    [Held by provider] heparin (porcine) injection 5,000 Units  5,000 Units SubCUTAneous Q8H    pantoprazole (PROTONIX) 40 mg in 0.9% sodium chloride 10 mL injection  40 mg IntraVENous Q12H    atorvastatin (LIPITOR) tablet 20 mg  20 mg Oral QHS    0.9% sodium chloride infusion 250 mL  250 mL IntraVENous PRN    0.9% sodium chloride infusion 250 mL  250 mL IntraVENous PRN       Review of Systems:   No fever or chills. No sore throat. No cough or hemoptysis. No shortness of breath or chest pain. No orthopnea or paroxysmal nocturnal dyspnea. Good appetite. No nausea, vomiting, abdominal pain, melena or hematochezia. No constipation or diarrhea. No dysuria, no gross hematuria of voiding   difficulties. No ankle swelling, no joint paints. No muscle aches. No skin changes. No dizziness or lightheadedness. No headaches.        Physical Assessment:     Vitals:    10/29/21 1315 10/29/21 1330 10/29/21 1345 10/29/21 1400   BP: (!) 177/94 (!) 180/100 (!) 185/100 (!) 184/98   Pulse: 71 71 71 70   Resp:       Temp:       TempSrc: SpO2:       Weight:       Height:         Last 3 Recorded Weights in this Encounter    10/27/21 1102 10/28/21 1354   Weight: 83.9 kg (185 lb) 83.9 kg (185 lb)     Admission weight: Weight: 83.9 kg (185 lb) (10/27/21 1102)      Intake/Output Summary (Last 24 hours) at 10/29/2021 1409  Last data filed at 10/29/2021 1218  Gross per 24 hour   Intake 720 ml   Output 200 ml   Net 520 ml       Patient is in no apparent distress. HEENT: Head is normocephalic and atraumatic. Neck: no cervical lymphadenopathy or thyromegaly. Lungs: good air entry, clear to auscultation bilaterally. Trachea at the midline. Cardiovascular system: S1, S2, regular rate and rhythm. Abdomen: soft, non tender, non distended. Positive bowel sounds. Extremities: no clubbing, cyanosis or edema. Integumentary: skin is grossly intact. Neurologic: Alert, oriented time three. Access [facility]      Data Review:    Labs: Results:       Chemistry Recent Labs     10/28/21  1740 10/28/21  1630 10/28/21  1045 10/27/21  1328 10/27/21  1115 10/27/21  1115   GLU  --   --  98 106*  --  129*   NA  --   --  135* 136  --  136   K  --   --  4.1 4.1  --  4.0   CL  --   --  99* 98*  --  98*   CO2  --   --  27 29  --  30   BUN  --   --  61* 47*  --  49*   CREA  --   --  6.05* 4.98*  --  5.20*   CA  --  8.0* 8.5 7.6*   < > 7.3*   AGAP  --   --  9 9  --  8   BUCR  --   --  10* 9*  --  9*   AP  --   --   --   --   --  445*   TP  --   --   --   --   --  7.5   ALB  --   --   --   --   --  3.0*   GLOB  --   --   --   --   --  4.5*   AGRAT  --   --   --   --   --  0.7*   PHOS 4.8  --   --   --   --   --     < > = values in this interval not displayed.          CBC w/Diff Recent Labs     10/29/21  1105 10/28/21  1045 10/27/21  1328 10/27/21  1115 10/27/21  1115   WBC  --  12.7 11.8  --  12.3   RBC  --  3.21* 1.93*  --  2.07*   HGB 9.8* 9.5* 5.7*   < > 6.2*   HCT 30.5* 30.1* 18.1*   < > 19.5*   PLT  --  154 153  --  163   GRANS  --   --  71  --  81*   LYMPH  --   -- 10*  --  8*   EOS  --   --  1  --  0    < > = values in this interval not displayed. Iron/Ferritin Recent Labs     10/28/21  1740   IRON 23*      PTH/VIT D No results for input(s): PTH in the last 72 hours.     No lab exists for component: VITD           Vinh Pratt MD  10/29/2021  2:09 PM      October 29, 2021

## 2021-11-09 PROBLEM — I10 ESSENTIAL HYPERTENSION: Status: ACTIVE | Noted: 2021-01-01

## 2021-11-09 NOTE — PROGRESS NOTES
Transitional Care Management Progress Note    Patient: Siri Arellano  : 1940  PCP: Sara Tarango NP    Date of admission: 10/27/2021  Date of discharge: 10/29/2021    Patient was contacted by Transitional Care Management services within two days after his discharge: No. This encounter and supporting documentation was reviewed if available. Medication reconciliation was performed today (11/10/2021). Assessment/Plan:     1. Hospital discharge follow-up  -     DC DISCHARGE MEDS RECONCILED W/ CURRENT OUTPATIENT MED LIST  2. Cerebrovascular accident (CVA), unspecified mechanism ([facility] [address]  Assessment & Plan:  Continue secondary prevention, consult neuro  Orders:  -     LIPID PANEL; Future  -     METABOLIC PANEL, COMPREHENSIVE; Future  -     REFERRAL TO NEUROLOGY  3. Essential hypertension  Assessment & Plan:  BP elevated, goal <130/80  Increase amlodipine to 10 mg, advised to call for LE edema  Will consider hydralazine 25 mg BID if BP goal not achieved at next follow-up  Orders:  -     METABOLIC PANEL, COMPREHENSIVE; Future  -     amLODIPine (NORVASC) 10 mg tablet; Take 1 Tablet by mouth daily. Indications: high blood pressure, Normal, Disp-90 Tablet, R-0  4. Anemia requiring transfusions  Assessment & Plan:  H/H improved upon discharge, monitor  Repeat CBC in 3 mos    Orders:  -     CBC WITH AUTOMATED DIFF; Future  5. ESRD (end stage renal disease) on dialysis [facility])  Assessment & Plan:  Continue management per renal  Orders:  -     METABOLIC PANEL, COMPREHENSIVE; Future  6. Chronic hepatitis C without hepatic coma (HCC)  Assessment & Plan:  Consult GI   Orders:  -     REFERRAL TO GASTROENTEROLOGY  7.  Left atrial dilation  Assessment & Plan:  Severe per most recent echo, with elevated BNP  Consult cards  Orders:  -     REFERRAL TO CARDIOLOGY  8. Elevated brain natriuretic peptide (BNP) level  -     REFERRAL TO CARDIOLOGY  9. Encounter to establish care    Follow-up and Dispositions    · Return in about 4 weeks (around 12/8/2021) for medicare wellness visit, blood pressure AND  · Return in 3 mos for history of stroke, blood pressure, anemia, ckd, lab results       Subjective:   Jerry Cardozo is a 79 y.o. male presenting today for follow-up after being discharged from [facility]. The discharge summary was reviewed or requested. The main problem requiring admission was CVA. Complications during admission: see dx list.    Hospital Course (discharge summary not currently available):    Hospitalist H&P:  Mecca Brock is a 79 y.o.  male who presented to [facility] for evaluation of lower extremity weakness and dizziness. Per wife, symptoms began Sunday evening and patient sustained a fall at home on Sunday night as a result. He continued to have issues into Monday but did not seek any medical attention until after he went to dialysis on Tuesday and much more weak than usual following his HD session. He subsequently presented to [facility] emergency department for evaluation. CT scan of the head was negative but MRI showed a left lacunar infarct. Patient was referred for transfer to [facility] for further evaluation and treatment. Neurology notes: An 79years old male patient with medical history of hypertension and ESRD on hemodialysis [Tuesdays, Thursdays, and Saturdays] was admitted to the hospital for generalized weakness and difficulty maintaining his balance. Started about 5 days ago. On the night of October 24, he was trying to stand up to go to his bedroom after watching TV; he has difficulty standing up and fell down. He was unable to get up and he crawled to his bed. Wife helped him to get on the bed. There is a report of a little bit of confusion at the time. Next day, when he was trying to walk, was feeling off balance but was able to walk without any support. On October 26, he went for his dialysis; was more wobbly after finishing.  Had difficulty getting out of his car and fell down. Wife decided to take him to the emergency room yesterday. Patient denied feeling dizzy/lightheaded. Has generalized weakness. Does not have any numbness. Denies any changes in his speech. No headache, nausea, or vomiting. CBC is emergency room showed a hemoglobin of 6.2. CT scan of the brain did not show any acute changes. MRI of the brain showed punctate acute infarct in the dorsal left lentiform nucleus; moderate burden of chronic microvascular ischemic changes seen. CTA of the head and neck was unremarkable. No previous history of stroke. Denied any smoking or alcohol use. Interval history/Current status: Patient presents today for hospital follow-up, accompanied by wife Sharla Roberto. Wife states they do not have a blood pressure machine at home. He had an appointment with nephrology this morning and had an US of both arms for his AVF. Patient does dialysis every Tues, Thurs, Sat, has a temporary catheter in the meantime. They have not followed up with a neurologist and needs referral today. Patient and spouse state that they were never told patient had hepatitis C and he never took elbasvir/grazoprevir but this was on his medication list.      Admitting symptoms have: improved    Medications marked \"taking\" at this time:  Home Medications    Medication Sig Start Date End Date Taking? Authorizing Provider   amLODIPine (NORVASC) 10 mg tablet Take 1 Tablet by mouth daily. Indications: high blood pressure 11/10/21  Yes Scott Spivey NP   atorvastatin (LIPITOR) 20 mg tablet Take 1 Tablet by mouth nightly. 10/29/21  Yes Nazario Hui MD   aspirin (ASPIRIN) 325 mg tablet Take 1 Tablet by mouth daily. 10/30/21  Yes Nazario Hui MD   tamsulosin (FLOMAX) 0.4 mg capsule Take 1 Capsule by mouth nightly.    Yes Provider, Historical   sildenafil citrate (VIAGRA) 100 mg tablet take 1/2 to 1 tablet by mouth 1 HOUR PRIOR TO SEXUAL ACTIVITY - DO NOT EXCEED 1 TABLET IN 24 HOURS 12/18/20  Yes Provider, Historical   losartan (COZAAR) 100 mg tablet Take 50 mg by mouth daily. 3/1/21  Yes Other, MD Guerrero   nebivolol (BYSTOLIC) 10 mg tablet Take 10 mg by mouth daily. Yes Provider, Historical   elbasvir-grazoprevir (Zepatier)  mg tab Take 1 Tablet by mouth daily. Provider, Historical   amLODIPine (NORVASC) 5 mg tablet Take 5 mg by mouth daily. 11/10/21  Provider, Historical          Patient Active Problem List   Diagnosis Code    Ataxia R27.0    Weakness R53.1    Anemia requiring transfusions D64.9    Leg weakness R29.898    Stroke (cerebrum) (HCC) I63.9    Essential hypertension I10    ESRD (end stage renal disease) on dialysis ([facility]) N18.6, Z99.2    Left atrial dilation I51.7    Chronic hepatitis C without hepatic coma (HCC) B18.2    Elevated brain natriuretic peptide (BNP) level R79.89     Current Outpatient Medications   Medication Sig Dispense Refill    amLODIPine (NORVASC) 10 mg tablet Take 1 Tablet by mouth daily. Indications: high blood pressure 90 Tablet 0    atorvastatin (LIPITOR) 20 mg tablet Take 1 Tablet by mouth nightly. 30 Tablet 0    aspirin (ASPIRIN) 325 mg tablet Take 1 Tablet by mouth daily. 30 Tablet 0    tamsulosin (FLOMAX) 0.4 mg capsule Take 1 Capsule by mouth nightly.  sildenafil citrate (VIAGRA) 100 mg tablet take 1/2 to 1 tablet by mouth 1 HOUR PRIOR TO SEXUAL ACTIVITY - DO NOT EXCEED 1 TABLET IN 24 HOURS      losartan (COZAAR) 100 mg tablet Take 50 mg by mouth daily.  nebivolol (BYSTOLIC) 10 mg tablet Take 10 mg by mouth daily.  elbasvir-grazoprevir (Zepatier)  mg tab Take 1 Tablet by mouth daily.        No Known Allergies   Past Medical History:   Diagnosis Date    Chronic kidney disease     ESRD (end stage renal disease) on dialysis ([facility])     Hepatitis C     Hydrocele     Hypertension     Stroke [facility])       Social History     Socioeconomic History    Marital status:      Spouse name: Not on file    Number of children: Not on file    Years of education: Not on file    Highest education level: Not on file   Occupational History    Not on file   Tobacco Use    Smoking status: Never Smoker    Smokeless tobacco: Never Used   Vaping Use    Vaping Use: Never used   Substance and Sexual Activity    Alcohol use: No    Drug use: No    Sexual activity: Not Currently   Other Topics Concern    Not on file   Social History Narrative    Not on file     Social Determinants of Health     Financial Resource Strain:     Difficulty of Paying Living Expenses: Not on file   Food Insecurity:     Worried About Running Out of Food in the Last Year: Not on file    Larry of Food in the Last Year: Not on file   Transportation Needs:     Lack of Transportation (Medical): Not on file    Lack of Transportation (Non-Medical): Not on file   Physical Activity:     Days of Exercise per Week: Not on file    Minutes of Exercise per Session: Not on file   Stress:     Feeling of Stress : Not on file   Social Connections:     Frequency of Communication with Friends and Family: Not on file    Frequency of Social Gatherings with Friends and Family: Not on file    Attends Christian Services: Not on file    Active Member of [address] [place] [address] or Organizations: Not on file    Attends Club or Organization Meetings: Not on file    Marital Status: Not on file   Intimate Partner Violence:     Fear of Current or Ex-Partner: Not on file    Emotionally Abused: Not on file    Physically Abused: Not on file    Sexually Abused: Not on file   Housing Stability:     Unable to Pay for Housing in the Last Year: Not on file    Number of [place] in the Last Year: Not on file    Unstable Housing in the Last Year: Not on file      Past Surgical History:   Procedure Laterality Date    HX BUNIONECTOMY Right     HX HERNIA REPAIR Right     HX OTHER SURGICAL      hydrocele surgery      History reviewed. No pertinent family history.      Review of Systems   Constitutional: Negative for chills, fever and malaise/fatigue. Respiratory: Negative for shortness of breath. Cardiovascular: Negative for chest pain. Gastrointestinal: Negative for abdominal pain, nausea and vomiting. Neurological: Positive for focal weakness (right side). Negative for dizziness, tingling, speech change and headaches. Objective:   BP (!) 157/80 (BP 1 Location: Left upper arm, BP Patient Position: Sitting, BP Cuff Size: Adult)   Pulse 65   Temp 97.8 °F (36.6 °C) (Oral)   Resp 18   Ht 6' 2\" (1.88 m)   Wt 169 lb (76.7 kg)   SpO2 100%   BMI 21.70 kg/m²      Physical Exam  Vitals and nursing note reviewed. Constitutional:       General: He is not in acute distress. Appearance: He is not toxic-appearing. HENT:      Head: Normocephalic and atraumatic. Cardiovascular:      Rate and Rhythm: Normal rate and regular rhythm. Heart sounds: No murmur heard. No friction rub. No gallop. Pulmonary:      Effort: Pulmonary effort is normal. No respiratory distress. Breath sounds: No stridor. No wheezing, rhonchi or rales. Musculoskeletal:         General: Normal range of motion. Cervical back: Normal range of motion. Neurological:      General: No focal deficit present. Mental Status: He is alert and oriented to person, place, and time. Motor: Weakness (RUE) present. Psychiatric:         Mood and Affect: Mood normal.         Thought Content:  Thought content normal.         Judgment: Judgment normal.        Kevin Abad NP

## 2021-11-10 PROBLEM — I51.7 LEFT ATRIAL DILATION: Status: ACTIVE | Noted: 2021-01-01

## 2021-11-10 PROBLEM — B18.2 CHRONIC HEPATITIS C WITHOUT HEPATIC COMA (HCC): Status: ACTIVE | Noted: 2021-01-01

## 2021-11-10 PROBLEM — Z99.2 ESRD (END STAGE RENAL DISEASE) ON DIALYSIS (HCC): Status: ACTIVE | Noted: 2021-01-01

## 2021-11-10 PROBLEM — N18.6 ESRD (END STAGE RENAL DISEASE) ON DIALYSIS (HCC): Status: ACTIVE | Noted: 2021-01-01

## 2021-11-10 PROBLEM — R79.89 ELEVATED BRAIN NATRIURETIC PEPTIDE (BNP) LEVEL: Status: ACTIVE | Noted: 2021-01-01

## 2021-11-10 NOTE — ASSESSMENT & PLAN NOTE
BP elevated, goal <130/80  Increase amlodipine to 10 mg, advised to call for LE edema  Will consider hydralazine 25 mg BID if BP goal not achieved at next follow-up

## 2021-11-10 NOTE — PATIENT INSTRUCTIONS
Heart-Healthy Diet: Care Instructions  Your Care Instructions     A heart-healthy diet has lots of vegetables, fruits, nuts, beans, and whole grains, and is low in salt. It limits foods that are high in saturated fat, such as meats, cheeses, and fried foods. It may be hard to change your diet, but even small changes can lower your risk of heart attack and heart disease. Follow-up care is a key part of your treatment and safety. Be sure to make and go to all appointments, and call your doctor if you are having problems. It's also a good idea to know your test results and keep a list of the medicines you take. How can you care for yourself at home? Watch your portions  · Use food labels to learn what the recommended servings are for the foods you eat. · Eat only the number of calories you need to stay at a healthy weight. If you need to lose weight, eat fewer calories than your body burns (through exercise and other physical activity). Eat more fruits and vegetables  · Eat a variety of fruit and vegetables every day. Dark green, deep orange, red, or yellow fruits and vegetables are especially good for you. Examples include spinach, carrots, peaches, and berries. · Keep carrots, celery, and other veggies handy for snacks. Buy fruit that is in season and store it where you can see it so that you will be tempted to eat it. · Cook dishes that have a lot of veggies in them, such as stir-fries and soups. Limit saturated fat  · Read food labels, and try to avoid saturated fats. They increase your risk of heart disease. · Use olive or canola oil when you cook. · Bake, broil, grill, or steam foods instead of frying them. · Choose lean meats instead of high-fat meats such as hot dogs and sausages. Cut off all visible fat when you prepare meat. · Eat fish, skinless poultry, and meat alternatives such as soy products instead of high-fat meats.  Soy products, such as tofu, may be especially good for your heart.  · Choose low-fat or fat-free milk and dairy products. Eat foods high in fiber  · Eat a variety of grain products every day. Include whole-grain foods that have lots of fiber and nutrients. Examples of whole-grain foods include oats, whole wheat bread, and brown rice. · Buy whole-grain breads and cereals, instead of white bread or pastries. Limit salt and sodium  · Limit how much salt and sodium you eat to help lower your blood pressure. · Taste food before you salt it. Add only a little salt when you think you need it. With time, your taste buds will adjust to less salt. · Eat fewer snack items, fast foods, and other high-salt, processed foods. Check food labels for the amount of sodium in packaged foods. · Choose low-sodium versions of canned goods (such as soups, vegetables, and beans). Limit sugar  · Limit drinks and foods with added sugar. These include candy, desserts, and soda pop. Limit alcohol  · Limit alcohol to no more than 2 drinks a day for men and 1 drink a day for women. Too much alcohol can cause health problems. When should you call for help? Watch closely for changes in your health, and be sure to contact your doctor if:    · You would like help planning heart-healthy meals. Where can you learn more? Go to http://www.argueta.com/  Enter V137 in the search box to learn more about \"Heart-Healthy Diet: Care Instructions. \"  Current as of: December 17, 2020               Content Version: 13.0  © 6563-2418 Healthwise, Incorporated. Care instructions adapted under license by [employer] (which disclaims liability or warranty for this information). If you have questions about a medical condition or this instruction, always ask your healthcare professional. [place] [address] any warranty or liability for your use of this information.

## 2021-11-10 NOTE — PROGRESS NOTES
Dakota Dutton presents today for   Chief Complaint   Patient presents with   [facility] Follow Up    Knee Pain     occassional swelling     Leg Problem     weakness in both legs        Is someone accompanying this pt? Yes, patient wife Mrs. Kumar. Is the patient using any DME equipment during OV? No     Depression Screening:  3 most recent PHQ Screens 11/10/2021   Little interest or pleasure in doing things Not at all   Feeling down, depressed, irritable, or hopeless Not at all   Total Score PHQ 2 0       Learning Assessment:  Learning Assessment 11/10/2021   PRIMARY LEARNER Patient   HIGHEST LEVEL OF EDUCATION - PRIMARY LEARNER  GRADUATED HIGH SCHOOL OR GED   BARRIERS PRIMARY LEARNER NONE   CO-LEARNER CAREGIVER No   PRIMARY LANGUAGE ENGLISH   LEARNER PREFERENCE PRIMARY DEMONSTRATION   ANSWERED BY patient    RELATIONSHIP SELF       Fall Risk  Fall Risk Assessment, last 12 mths 11/10/2021   Able to walk? Yes   Fall in past 12 months? 1   Do you feel unsteady? 0   Are you worried about falling 0   Is TUG test greater than 12 seconds? 0   Is the gait abnormal? 1   Number of falls in past 12 months 2   Fall with injury? 0       ADL  No flowsheet data found. Travel Screening:    Travel Screening     Question   Response    In the last month, have you been in contact with someone who was confirmed or suspected to have Coronavirus / COVID-19? No / Unsure    Have you had a COVID-19 viral test in the last 14 days? No    Do you have any of the following new or worsening symptoms? Have you traveled internationally or domestically in the last month? No      Travel History   Travel since 10/10/21    No documented travel since 10/10/21         Health Maintenance reviewed and discussed and ordered per Provider.     Health Maintenance Due   Topic Date Due    DTaP/Tdap/Td series (1 - Tdap) Never done    Shingrix Vaccine Age 50> (1 of 2) Never done    Pneumococcal 65+ yrs at Risk Vaccine (1 of 2 - PCV13) Never done    Medicare Yearly Exam  Never done    Flu Vaccine (1) Never done    COVID-19 Vaccine (3 - Booster for Steele Peter series) 10/30/2021   . Coordination of Care:  1. Have you been to the ER, urgent care clinic since your last visit? Hospitalized since your last visit? No     2. Have you seen or consulted any other health care providers outside of the [address] [place] [address] since your last visit? Include any pap smears or colon screening.  No

## 2021-11-29 NOTE — DISCHARGE SUMMARY
Discharge Summary    Patient: Sofie You MRN: [id number]  CSN: [id number]    YOB: 1940  Age: 79 y.o. Sex: male    DOA: 10/27/2021 LOS:  LOS: 2 days   Discharge Date: 10/29/2021     Admission Diagnoses: Ataxia [R27.0]  Weakness [R53.1]  Anemia requiring transfusions [D64.9]  Leg weakness [R29.898]    Discharge Diagnoses:    Acute left lacunar CVA  Severe anemia status post 2 units PRBCs  ESRD, HD dependent  Hypertension  Advanced age    Discharge Condition: Stable    PHYSICAL EXAM  Visit Vitals  BP (!) 195/90   Pulse 73   Temp 97.7 °F (36.5 °C) (Oral)   Resp 18   Ht 6' (1.829 m)   Wt 83.9 kg (185 lb)   SpO2 99%   BMI 25.09 kg/m²       General: In NAD. Nontoxic-appearing. HEENT: NCAT. Sclerae anicteric, EOMI. OP clear. Lungs:  Clear, no wheezes. No accessory muscle use. Heart:  RRR. Abdomen: Soft, NT/ND. Extremities: Warm, no edema or ischemia. Psych:   Mood normal.  Neurologic:  Awake and alert, moves extremities spontaneously. Motor grossly nonfocal.    Hospital Course:   See admission H&P for full details of HPI. Patient was admitted to the hospital after presenting to the emergency department for evaluation of generalized weakness and balance problems. MRI of the brain showed an acute left lacunar infarct with an underlying burden of chronic microvascular disease. Neurology evaluation was obtained. Recommendation has been for medical management with statin and antiplatelet therapy. PT/OT evaluations and recommendation from discharge home. Patient without any skilled needs at this time shower chair and transfer bench recommended. Necessary DME has been ordered. Patient has met maximum benefit of hospitalization and is medically stable for discharge home with outpatient follow-up as advised. Consults:   Neurology  Nephrology      Significant Diagnostic Studies/Procedures:   2D echo:   Interpretation Summary    Result status: Final result  · IAS: Agitated saline contrast study was performed. There was no shunting at baseline or with Valsalva. · LV: Estimated LVEF is 50 - 55%. Visually measured ejection fraction. Normal cavity size. Mild concentric hypertrophy. Low normal systolic function. Moderate (grade 2) left ventricular diastolic dysfunction. · LA: Severely dilated left atrium. Left Atrium volume index is 50 mL/m2. · RV: Dilated right ventricle. Reduced systolic function. · AV: Mild aortic valve regurgitation is present. · AO: Ascending aorta diameter = 4.1 cm. Comparison Study Information      Prior Study    There is no prior study available for comparison           MRI brain:  IMPRESSION        1. Punctate acute lacunar infarct in the dorsal left lentiform nucleus. 2.  Moderate burden of chronic microvascular ischemic disease. CT head:  IMPRESSION     No acute findings. Moderate sequela of chronic microvascular ischemic disease and mild generalized  volume loss. CT head/neck:  IMPRESSION     Patent intra- and extracranial cerebral arteries. No evidence of a large vessel  occlusion or high-grade stenosis. CXR:  IMPRESSION     Enlarged cardiac silhouette.     Hazy bilateral airspace opacities. Follow-up can be obtained. Discharge Medications:     Discharge Medication List as of 10/29/2021  6:01 PM      START taking these medications    Details   atorvastatin (LIPITOR) 20 mg tablet Take 1 Tablet by mouth nightly., Normal, Disp-30 Tablet, R-0      aspirin (ASPIRIN) 325 mg tablet Take 1 Tablet by mouth daily. , Normal, Disp-30 Tablet, R-0         CONTINUE these medications which have NOT CHANGED    Details   sildenafil citrate (VIAGRA) 100 mg tablet take 1/2 to 1 tablet by mouth 1 HOUR PRIOR TO SEXUAL ACTIVITY - DO NOT EXCEED 1 TABLET IN 24 HOURS, Historical Med      elbasvir-grazoprevir (Zepatier)  mg tab Take 1 Tablet by mouth daily. , Historical Med      tamsulosin (FLOMAX) 0.4 mg capsule Take 1 Capsule by mouth nightly., Historical Med losartan (COZAAR) 100 mg tablet Take 50 mg by mouth daily. , Historical Med      nebivolol (BYSTOLIC) 10 mg tablet Take 10 mg by mouth daily. , Historical Med      amLODIPine (NORVASC) 5 mg tablet Take 5 mg by mouth daily. , Historical Med             Activity: As tolerated. Diet: Cardiac Diet and Renal Diet    Disposition: Home. Follow-up: with PCP in 1 week and neurology as directed. Jen King MD  [address] [place], [place] [address]ists    Disclaimer: Sections of this note are dictated using utilizing voice recognition software. Minor typographical errors may be present. If questions arise, please do not hesitate to contact me or call our department.

## 2021-12-09 NOTE — ASSESSMENT & PLAN NOTE
BP elevated, goal <130/80  Increase Losartan to 100 mg and follow-up as scheduled in 4 weeks with cardiology

## 2021-12-10 NOTE — PROGRESS NOTES
Advance Care Planning     Advance Care Planning (ACP) Physician/NP/PA Conversation      Date of Conversation: 12/10/2021  Conducted with: Patient with Decision Making Capacity    Healthcare Decision Maker:     Primary Decision Maker: Kati Cornejo - Spouse - [phone]  Click here to complete [address] including selection of the Healthcare Decision Maker Relationship (ie \"Primary\")  Today we documented Decision Maker(s). The patient will provide ACP documents. Care Preferences:    Hospitalization: \"If your health worsens and it becomes clear that your chance of recovery is unlikely, what would be your preference regarding hospitalization? \"  The patient would prefer comfort-focused treatment without hospitalization. Ventilation: \"If you were unable to breathe on your own and your chance of recovery was unlikely, what would be your preference about the use of a ventilator (breathing machine) if it was available to you? \"   The patient would desire the use of a ventilator. Resuscitation: \"In the event your heart stopped as a result of an underlying serious health condition, would you want attempts to be made to restart your heart, or would you prefer a natural death? \"   Yes, attempt to resuscitate.     Conversation Outcomes / Follow-Up Plan:   ACP in process - information provided, considering goals and options    Length of Voluntary ACP Conversation in minutes:  16 minutes    Nancy Chahal NP

## 2021-12-10 NOTE — PROGRESS NOTES
Chief Complaint   Patient presents with    Annual Wellness Visit    Hypertension       1. \"Have you been to the ER, urgent care clinic since your last visit? Hospitalized since your last visit? \" No    2. \"Have you seen or consulted any other health care providers outside of the [address] [place] [address] since your last visit? \" No     3. For patients aged 39-70: Has the patient had a colonoscopy? NA based on age or sex     If the patient is female:    [employer]. For patients aged 41-77: Has the patient had a mammogram within the past 2 years? NA based on age or sex    11. For patients aged 21-65: Has the patient had a pap smear? NA based on age or sex    Learning Assessment 11/10/2021   PRIMARY LEARNER Patient   HIGHEST LEVEL OF EDUCATION - PRIMARY LEARNER  GRADUATED HIGH SCHOOL OR GED   BARRIERS PRIMARY LEARNER NONE   CO-LEARNER CAREGIVER No   PRIMARY LANGUAGE ENGLISH   LEARNER PREFERENCE PRIMARY DEMONSTRATION   ANSWERED BY patient    RELATIONSHIP SELF     3 most recent PHQ Screens 11/10/2021   Little interest or pleasure in doing things Not at all   Feeling down, depressed, irritable, or hopeless Not at all   Total Score PHQ 2 0     Fall Risk Assessment, last 12 mths 11/10/2021   Able to walk? Yes   Fall in past 12 months? 1   Do you feel unsteady? 0   Are you worried about falling 0   Is TUG test greater than 12 seconds? 0   Is the gait abnormal? 1   Number of falls in past 12 months 2   Fall with injury? 0     No flowsheet data found. Evette Quiroz

## 2021-12-10 NOTE — PROGRESS NOTES
This is the Subsequent Medicare Annual Wellness Exam, performed 12 months or more after the Initial AWV or the last Subsequent AWV    I have reviewed the patient's medical history in detail and updated the computerized patient record. Assessment/Plan   Education and counseling provided:  Are appropriate based on today's review and evaluation  End-of-Life planning (with patient's consent)  Pneumococcal Vaccine    1. Medicare annual wellness visit, subsequent  2. ACP (advance care planning)  3. Essential hypertension  Assessment & Plan:  BP elevated, goal <130/80  Increase Losartan to 100 mg and follow-up as scheduled in 4 weeks with cardiology  Orders:  -     losartan (COZAAR) 100 mg tablet; Take 1 Tablet by mouth daily. Indications: high blood pressure, Normal, Disp-90 Tablet, R-0  4. Left skew foot deformity  Comments: Worsening, consult podiatry  Orders:  -     REFERRAL TO PODIATRY     Follow-up and Dispositions    · Return in about 2 months (around 2/10/2022) for history of stroke, blood pressure, anemia, chronic kidney disease, lab results. Physical Exam  Vitals and nursing note reviewed. Constitutional:       General: He is not in acute distress. Appearance: Normal appearance. He is not ill-appearing. HENT:      Head: Normocephalic and atraumatic. Cardiovascular:      Rate and Rhythm: Normal rate and regular rhythm. Heart sounds: No murmur heard. No friction rub. No gallop. Pulmonary:      Effort: Pulmonary effort is normal. No respiratory distress. Breath sounds: Normal breath sounds. No wheezing or rales. Musculoskeletal:         General: Normal range of motion. Cervical back: Normal range of motion. Feet:    Neurological:      General: No focal deficit present. Mental Status: He is alert and oriented to person, place, and time. Psychiatric:         Mood and Affect: Mood normal.         Thought Content:  Thought content normal.         Judgment: Judgment normal.       Depression Risk Factor Screening     3 most recent PHQ Screens 12/10/2021   Little interest or pleasure in doing things Not at all   Feeling down, depressed, irritable, or hopeless Not at all   Total Score PHQ 2 0       Alcohol Risk Screen    Do you average more than 1 drink per night or more than 7 drinks a week: No    In the past three months have you have had more than 4 drinks containing alcohol on one occasion: No        Functional Ability and Level of Safety    Hearing: was previously evaluated but too costly for patient      Activities of Daily Living: The home contains: no safety equipment. Patient does total self care      Ambulation: with mild difficulty     Fall Risk:  Fall Risk Assessment, last 12 mths 12/10/2021   Able to walk? Yes   Fall in past 12 months? 0   Do you feel unsteady? 0   Are you worried about falling 1   Is TUG test greater than 12 seconds? -   Is the gait abnormal? -   Number of falls in past 12 months -   Fall with injury?  -      Abuse Screen:  Patient is not abused     Cognitive Screening    Has your family/caregiver stated any concerns about your memory: no     Health Maintenance Due     Health Maintenance Due   Topic Date Due    DTaP/Tdap/Td series (1 - Tdap) Never done    Shingrix Vaccine Age 50> (1 of 2) Never done    Pneumococcal 65+ yrs at Risk Vaccine (1 of 2 - PCV13) Never done    Medicare Yearly Exam  Never done    COVID-19 Vaccine (3 - Booster for [employer] series) 10/30/2021       Patient Care Team   Patient Care Team:  Harshad Fnag NP as PCP - General (Nurse Practitioner)  Harshad Fang NP as PCP - REHABILITATION [facility] Empaneled Provider    History     Patient Active Problem List   Diagnosis Code    Ataxia R27.0    Weakness R53.1    Anemia requiring transfusions D64.9    Leg weakness R29.898    Stroke (cerebrum) ([facility] I63.9    Essential hypertension I10    ESRD (end stage renal disease) on dialysis ([facility] N18.6, Z99.2    Left atrial dilation I51.7    Chronic hepatitis C without hepatic coma (HCC) B18.2    Elevated brain natriuretic peptide (BNP) level R79.89     Past Medical History:   Diagnosis Date    Chronic kidney disease     ESRD (end stage renal disease) on dialysis ([facility]     Hepatitis C     Hydrocele     Hypertension     Stroke [facility])       Past Surgical History:   Procedure Laterality Date    HX BUNIONECTOMY Right     HX HERNIA REPAIR Right     HX OTHER SURGICAL      hydrocele surgery     Current Outpatient Medications   Medication Sig Dispense Refill    losartan (COZAAR) 100 mg tablet Take 1 Tablet by mouth daily. Indications: high blood pressure 90 Tablet 0    amLODIPine (NORVASC) 10 mg tablet Take 1 Tablet by mouth daily. Indications: high blood pressure 90 Tablet 0    atorvastatin (LIPITOR) 20 mg tablet Take 1 Tablet by mouth nightly. 30 Tablet 0    aspirin (ASPIRIN) 325 mg tablet Take 1 Tablet by mouth daily. 30 Tablet 0    elbasvir-grazoprevir (Zepatier)  mg tab Take 1 Tablet by mouth daily.  tamsulosin (FLOMAX) 0.4 mg capsule Take 1 Capsule by mouth nightly.  sildenafil citrate (VIAGRA) 100 mg tablet take 1/2 to 1 tablet by mouth 1 HOUR PRIOR TO SEXUAL ACTIVITY - DO NOT EXCEED 1 TABLET IN 24 HOURS      nebivolol (BYSTOLIC) 10 mg tablet Take 10 mg by mouth daily. No Known Allergies    History reviewed. No pertinent family history.   Social History     Tobacco Use    Smoking status: Never Smoker    Smokeless tobacco: Never Used   Substance Use Topics    Alcohol use: No         Yadiel Del Castillo NP

## 2021-12-10 NOTE — PATIENT INSTRUCTIONS
Medicare Wellness Visit, Male    The best way to live healthy is to have a lifestyle where you eat a well-balanced diet, exercise regularly, limit alcohol use, and quit all forms of tobacco/nicotine, if applicable. Regular preventive services are another way to keep healthy. Preventive services (vaccines, screening tests, monitoring & exams) can help personalize your care plan, which helps you manage your own care. Screening tests can find health problems at the earliest stages, when they are easiest to treat. [facility] follows the current, evidence-based guidelines published by the [facility] ([facility]) when recommending preventive services for our patients. Because we follow these guidelines, sometimes recommendations change over time as research supports it. (For example, a prostate screening blood test is no longer routinely recommended for men with no symptoms). Of course, you and your doctor may decide to screen more often for some diseases, based on your risk and co-morbidities (chronic disease you are already diagnosed with). Preventive services for you include:  - Medicare offers their members a free annual wellness visit, which is time for you and your primary care provider to discuss and plan for your preventive service needs. Take advantage of this benefit every year!  -All adults over age 72 should receive the recommended pneumonia vaccines. Current USPSTF guidelines recommend a series of two vaccines for the best pneumonia protection.   -All adults should have a flu vaccine yearly and tetanus vaccine every 10 years.  -All adults age 48 and older should receive the shingles vaccines (series of two vaccines).        -All adults age 38-68 who are overweight should have a diabetes screening test once every three years.   -Other screening tests & preventive services for persons with diabetes include: an eye exam to screen for diabetic retinopathy, a kidney function test, a foot exam, and stricter control over your cholesterol.   -Cardiovascular screening for adults with routine risk involves an electrocardiogram (ECG) at intervals determined by the provider.   -Colorectal cancer screening should be done for adults age 54-65 with no increased risk factors for colorectal cancer. There are a number of acceptable methods of screening for this type of cancer. Each test has its own benefits and drawbacks. Discuss with your provider what is most appropriate for you during your annual wellness visit. The different tests include: colonoscopy (considered the best screening method), a fecal occult blood test, a fecal DNA test, and sigmoidoscopy.  -All adults born between [facility] should be screened once for Hepatitis C.  -An Abdominal Aortic Aneurysm (AAA) Screening is recommended for men age 73-68 who has ever smoked in their lifetime.      Here is a list of your current Health Maintenance items (your personalized list of preventive services) with a due date:  Health Maintenance Due   Topic Date Due    DTaP/Tdap/Td  (1 - Tdap) Never done    Shingles Vaccine (1 of 2) Never done    Pneumococcal Vaccine 65+ years at Risk (1 of 2 - PCV13) Never done    Annual Well Visit  Never done    COVID-19 Vaccine (3 - Booster for [employer] series) 10/30/2021

## 2022-01-01 ENCOUNTER — TELEPHONE (OUTPATIENT)
Dept: FAMILY MEDICINE CLINIC | Age: 82
End: 2022-01-01

## 2022-01-01 ENCOUNTER — OFFICE VISIT (OUTPATIENT)
Dept: FAMILY MEDICINE CLINIC | Age: 82
End: 2022-01-01
Payer: MEDICARE

## 2022-01-01 ENCOUNTER — HOSPITAL ENCOUNTER (OUTPATIENT)
Dept: LAB | Age: 82
Discharge: HOME | End: 2022-02-07
Payer: MEDICARE

## 2022-01-01 ENCOUNTER — TRANSCRIBE ORDER (OUTPATIENT)
Dept: SCHEDULING | Age: 82
End: 2022-01-01

## 2022-01-01 ENCOUNTER — HOSPITAL ENCOUNTER (EMERGENCY)
Age: 82
Discharge: OTHER INSTITUTION | End: 2022-03-29
Attending: EMERGENCY MEDICINE | Admitting: INTERNAL MEDICINE
Payer: MEDICARE

## 2022-01-01 ENCOUNTER — OFFICE VISIT (OUTPATIENT)
Dept: CARDIOLOGY CLINIC | Age: 82
End: 2022-01-01
Payer: MEDICARE

## 2022-01-01 ENCOUNTER — APPOINTMENT (OUTPATIENT)
Dept: CT IMAGING | Age: 82
End: 2022-01-01
Attending: EMERGENCY MEDICINE
Payer: MEDICARE

## 2022-01-01 ENCOUNTER — HOSPITAL ENCOUNTER (OUTPATIENT)
Dept: LAB | Age: 82
Discharge: HOME | End: 2022-02-08
Payer: MEDICARE

## 2022-01-01 ENCOUNTER — APPOINTMENT (OUTPATIENT)
Dept: GENERAL RADIOLOGY | Age: 82
End: 2022-01-01
Attending: EMERGENCY MEDICINE
Payer: MEDICARE

## 2022-01-01 VITALS
HEART RATE: 73 BPM | OXYGEN SATURATION: 98 % | TEMPERATURE: 97.1 F | SYSTOLIC BLOOD PRESSURE: 124 MMHG | DIASTOLIC BLOOD PRESSURE: 68 MMHG | BODY MASS INDEX: 20.28 KG/M2 | WEIGHT: 158 LBS | HEIGHT: 74 IN | RESPIRATION RATE: 20 BRPM

## 2022-01-01 VITALS
BODY MASS INDEX: 20.29 KG/M2 | OXYGEN SATURATION: 84 % | SYSTOLIC BLOOD PRESSURE: 52 MMHG | HEART RATE: 43 BPM | TEMPERATURE: 93.4 F | WEIGHT: 158 LBS | DIASTOLIC BLOOD PRESSURE: 30 MMHG

## 2022-01-01 VITALS
WEIGHT: 163 LBS | DIASTOLIC BLOOD PRESSURE: 72 MMHG | SYSTOLIC BLOOD PRESSURE: 136 MMHG | HEIGHT: 74 IN | BODY MASS INDEX: 20.92 KG/M2 | OXYGEN SATURATION: 98 % | HEART RATE: 60 BPM

## 2022-01-01 DIAGNOSIS — N18.6 ESRD (END STAGE RENAL DISEASE) (HCC): ICD-10-CM

## 2022-01-01 DIAGNOSIS — I10 ESSENTIAL HYPERTENSION: ICD-10-CM

## 2022-01-01 DIAGNOSIS — K66.1 HEMOPERITONEUM: ICD-10-CM

## 2022-01-01 DIAGNOSIS — Z99.2 ESRD (END STAGE RENAL DISEASE) ON DIALYSIS (HCC): ICD-10-CM

## 2022-01-01 DIAGNOSIS — Z71.2 ENCOUNTER TO DISCUSS TEST RESULTS: ICD-10-CM

## 2022-01-01 DIAGNOSIS — R57.9 SHOCK (HCC): Primary | ICD-10-CM

## 2022-01-01 DIAGNOSIS — Z86.73 HISTORY OF CVA (CEREBROVASCULAR ACCIDENT): Primary | ICD-10-CM

## 2022-01-01 DIAGNOSIS — B18.2 CHRONIC HEPATITIS C (HCC): Primary | ICD-10-CM

## 2022-01-01 DIAGNOSIS — N18.6 ESRD (END STAGE RENAL DISEASE) ON DIALYSIS (HCC): ICD-10-CM

## 2022-01-01 DIAGNOSIS — K74.60 CIRRHOSIS, NON-ALCOHOLIC (HCC): ICD-10-CM

## 2022-01-01 DIAGNOSIS — I11.9 HYPERTENSIVE HEART DISEASE WITHOUT HEART FAILURE: Primary | ICD-10-CM

## 2022-01-01 DIAGNOSIS — D64.9 ANEMIA REQUIRING TRANSFUSIONS: ICD-10-CM

## 2022-01-01 DIAGNOSIS — E78.5 HYPERLIPIDEMIA LDL GOAL <70: ICD-10-CM

## 2022-01-01 DIAGNOSIS — R73.9 HYPERGLYCEMIA: ICD-10-CM

## 2022-01-01 DIAGNOSIS — I63.9 CEREBROVASCULAR ACCIDENT (CVA), UNSPECIFIED MECHANISM (HCC): ICD-10-CM

## 2022-01-01 DIAGNOSIS — I11.9 HYPERTENSIVE HEART DISEASE WITHOUT HEART FAILURE: ICD-10-CM

## 2022-01-01 DIAGNOSIS — I10 ESSENTIAL HYPERTENSION: Primary | ICD-10-CM

## 2022-01-01 DIAGNOSIS — I51.7 LEFT ATRIAL DILATION: ICD-10-CM

## 2022-01-01 DIAGNOSIS — B18.2 CHRONIC HEPATITIS C WITHOUT HEPATIC COMA (HCC): ICD-10-CM

## 2022-01-01 LAB
ALBUMIN SERPL-MCNC: 1.8 G/DL (ref 3.4–5)
ALBUMIN SERPL-MCNC: 2.6 G/DL (ref 3.4–5)
ALBUMIN/GLOB SERPL: 0.4 {RATIO} (ref 0.8–1.7)
ALBUMIN/GLOB SERPL: 0.5 {RATIO} (ref 0.8–1.7)
ALP SERPL-CCNC: 379 U/L (ref 45–117)
ALP SERPL-CCNC: 508 U/L (ref 45–117)
ALT SERPL-CCNC: 72 U/L (ref 16–61)
ALT SERPL-CCNC: 81 U/L (ref 16–61)
AMMONIA PLAS-SCNC: 61 UMOL/L (ref 11–32)
ANION GAP SERPL CALC-SCNC: 6 MMOL/L (ref 3–18)
ANION GAP SERPL CALC-SCNC: 9 MMOL/L (ref 3–18)
AST SERPL-CCNC: 157 U/L (ref 10–38)
AST SERPL-CCNC: 165 U/L (ref 10–38)
ATRIAL RATE: 64 BPM
BASE DEFICIT BLD-SCNC: 1.6 MMOL/L
BASOPHILS # BLD: 0.1 K/UL (ref 0–0.1)
BASOPHILS # BLD: 0.1 K/UL (ref 0–0.1)
BASOPHILS NFR BLD: 1 % (ref 0–2)
BASOPHILS NFR BLD: 2 % (ref 0–2)
BILIRUB SERPL-MCNC: 1 MG/DL (ref 0.2–1)
BILIRUB SERPL-MCNC: 1.2 MG/DL (ref 0.2–1)
BUN SERPL-MCNC: 27 MG/DL (ref 7–18)
BUN SERPL-MCNC: 41 MG/DL (ref 7–18)
BUN/CREAT SERPL: 6 (ref 12–20)
BUN/CREAT SERPL: 8 (ref 12–20)
CALCIUM SERPL-MCNC: 8.4 MG/DL (ref 8.5–10.1)
CALCIUM SERPL-MCNC: 8.5 MG/DL (ref 8.5–10.1)
CALCULATED P AXIS, ECG09: 48 DEGREES
CALCULATED R AXIS, ECG10: -27 DEGREES
CALCULATED T AXIS, ECG11: 81 DEGREES
CHLORIDE SERPL-SCNC: 99 MMOL/L (ref 100–111)
CHLORIDE SERPL-SCNC: 99 MMOL/L (ref 100–111)
CHOLEST SERPL-MCNC: 179 MG/DL
CO2 SERPL-SCNC: 28 MMOL/L (ref 21–32)
CO2 SERPL-SCNC: 31 MMOL/L (ref 21–32)
COVID-19 RAPID TEST, COVR: NOT DETECTED
CREAT SERPL-MCNC: 4.75 MG/DL (ref 0.6–1.3)
CREAT SERPL-MCNC: 5.39 MG/DL (ref 0.6–1.3)
DIAGNOSIS, 93000: NORMAL
DIFFERENTIAL METHOD BLD: ABNORMAL
DIFFERENTIAL METHOD BLD: ABNORMAL
EOSINOPHIL # BLD: 0.1 K/UL (ref 0–0.4)
EOSINOPHIL # BLD: 0.1 K/UL (ref 0–0.4)
EOSINOPHIL NFR BLD: 2 % (ref 0–5)
EOSINOPHIL NFR BLD: 2 % (ref 0–5)
ERYTHROCYTE [DISTWIDTH] IN BLOOD BY AUTOMATED COUNT: 20.9 % (ref 11.6–14.5)
ERYTHROCYTE [DISTWIDTH] IN BLOOD BY AUTOMATED COUNT: 21.5 % (ref 11.6–14.5)
GLOBULIN SER CALC-MCNC: 4.6 G/DL (ref 2–4)
GLOBULIN SER CALC-MCNC: 5 G/DL (ref 2–4)
GLUCOSE SERPL-MCNC: 120 MG/DL (ref 74–99)
GLUCOSE SERPL-MCNC: 175 MG/DL (ref 74–99)
HCO3 BLD-SCNC: 22.9 MMOL/L (ref 22–26)
HCT VFR BLD AUTO: 25.4 % (ref 36–48)
HCT VFR BLD AUTO: 28 % (ref 36–48)
HDLC SERPL-MCNC: 37 MG/DL (ref 40–60)
HDLC SERPL: 4.8 {RATIO} (ref 0–5)
HGB BLD-MCNC: 8.7 G/DL (ref 13–16)
HGB BLD-MCNC: 8.8 G/DL (ref 13–16)
HISTORY CHECKED?,CKHIST: NORMAL
IMM GRANULOCYTES # BLD AUTO: 0 K/UL (ref 0–0.04)
IMM GRANULOCYTES # BLD AUTO: 0.1 K/UL (ref 0–0.04)
IMM GRANULOCYTES NFR BLD AUTO: 0 % (ref 0–0.5)
IMM GRANULOCYTES NFR BLD AUTO: 1 % (ref 0–0.5)
LACTATE BLD-SCNC: 5.04 MMOL/L (ref 0.4–2)
LACTATE BLD-SCNC: 6.96 MMOL/L (ref 0.4–2)
LDLC SERPL CALC-MCNC: 128 MG/DL (ref 0–100)
LIPASE SERPL-CCNC: 79 U/L (ref 73–393)
LIPID PROFILE,FLP: ABNORMAL
LYMPHOCYTES # BLD: 0.8 K/UL (ref 0.9–3.6)
LYMPHOCYTES # BLD: 1.3 K/UL (ref 0.9–3.6)
LYMPHOCYTES NFR BLD: 12 % (ref 21–52)
LYMPHOCYTES NFR BLD: 22 % (ref 21–52)
MAGNESIUM SERPL-MCNC: 2.2 MG/DL (ref 1.6–2.6)
MCH RBC QN AUTO: 29.9 PG (ref 24–34)
MCH RBC QN AUTO: 33.1 PG (ref 24–34)
MCHC RBC AUTO-ENTMCNC: 31.4 G/DL (ref 31–37)
MCHC RBC AUTO-ENTMCNC: 34.3 G/DL (ref 31–37)
MCV RBC AUTO: 95.2 FL (ref 78–100)
MCV RBC AUTO: 96.6 FL (ref 78–100)
MONOCYTES # BLD: 0.9 K/UL (ref 0.05–1.2)
MONOCYTES # BLD: 1.1 K/UL (ref 0.05–1.2)
MONOCYTES NFR BLD: 14 % (ref 3–10)
MONOCYTES NFR BLD: 16 % (ref 3–10)
NEUTS SEG # BLD: 3.6 K/UL (ref 1.8–8)
NEUTS SEG # BLD: 4.8 K/UL (ref 1.8–8)
NEUTS SEG NFR BLD: 60 % (ref 40–73)
NEUTS SEG NFR BLD: 69 % (ref 40–73)
NRBC # BLD: 0 K/UL (ref 0–0.01)
NRBC # BLD: 0 K/UL (ref 0–0.01)
NRBC BLD-RTO: 0 PER 100 WBC
NRBC BLD-RTO: 0 PER 100 WBC
P-R INTERVAL, ECG05: 198 MS
PCO2 BLD: 36.5 MMHG (ref 35–45)
PH BLD: 7.41 [PH] (ref 7.35–7.45)
PLATELET # BLD AUTO: 137 K/UL (ref 135–420)
PLATELET # BLD AUTO: 183 K/UL (ref 135–420)
PLATELET COMMENTS,PCOM: ABNORMAL
PMV BLD AUTO: 11.9 FL (ref 9.2–11.8)
PO2 BLD: 92 MMHG (ref 80–100)
POTASSIUM SERPL-SCNC: 3.7 MMOL/L (ref 3.5–5.5)
POTASSIUM SERPL-SCNC: 4.2 MMOL/L (ref 3.5–5.5)
PROT SERPL-MCNC: 6.4 G/DL (ref 6.4–8.2)
PROT SERPL-MCNC: 7.6 G/DL (ref 6.4–8.2)
Q-T INTERVAL, ECG07: 464 MS
QRS DURATION, ECG06: 82 MS
QTC CALCULATION (BEZET), ECG08: 478 MS
RBC # BLD AUTO: 2.63 M/UL (ref 4.35–5.65)
RBC # BLD AUTO: 2.94 M/UL (ref 4.35–5.65)
RBC MORPH BLD: ABNORMAL
RBC MORPH BLD: ABNORMAL
SAO2 % BLD: 97.2 % (ref 92–97)
SERVICE CMNT-IMP: ABNORMAL
SODIUM SERPL-SCNC: 136 MMOL/L (ref 136–145)
SODIUM SERPL-SCNC: 136 MMOL/L (ref 136–145)
SOURCE, COVRS: NORMAL
SPECIMEN TYPE: ABNORMAL
TRIGL SERPL-MCNC: 70 MG/DL (ref ?–150)
TROPONIN-HIGH SENSITIVITY: 11 NG/L (ref 0–78)
TSH SERPL DL<=0.05 MIU/L-ACNC: 7.41 UIU/ML (ref 0.36–3.74)
VENTRICULAR RATE, ECG03: 64 BPM
VLDLC SERPL CALC-MCNC: 14 MG/DL
WBC # BLD AUTO: 6.1 K/UL (ref 4.6–13.2)
WBC # BLD AUTO: 7 K/UL (ref 4.6–13.2)

## 2022-01-01 PROCEDURE — 82140 ASSAY OF AMMONIA: CPT

## 2022-01-01 PROCEDURE — 86920 COMPATIBILITY TEST SPIN: CPT

## 2022-01-01 PROCEDURE — 99215 OFFICE O/P EST HI 40 MIN: CPT | Performed by: NURSE PRACTITIONER

## 2022-01-01 PROCEDURE — 1101F PT FALLS ASSESS-DOCD LE1/YR: CPT | Performed by: INTERNAL MEDICINE

## 2022-01-01 PROCEDURE — 74177 CT ABD & PELVIS W/CONTRAST: CPT

## 2022-01-01 PROCEDURE — 99203 OFFICE O/P NEW LOW 30 MIN: CPT | Performed by: INTERNAL MEDICINE

## 2022-01-01 PROCEDURE — G8510 SCR DEP NEG, NO PLAN REQD: HCPCS | Performed by: INTERNAL MEDICINE

## 2022-01-01 PROCEDURE — 99285 EMERGENCY DEPT VISIT HI MDM: CPT

## 2022-01-01 PROCEDURE — 74011250637 HC RX REV CODE- 250/637: Performed by: EMERGENCY MEDICINE

## 2022-01-01 PROCEDURE — 96367 TX/PROPH/DG ADDL SEQ IV INF: CPT

## 2022-01-01 PROCEDURE — 96366 THER/PROPH/DIAG IV INF ADDON: CPT

## 2022-01-01 PROCEDURE — 74011000636 HC RX REV CODE- 636: Performed by: EMERGENCY MEDICINE

## 2022-01-01 PROCEDURE — 96365 THER/PROPH/DIAG IV INF INIT: CPT

## 2022-01-01 PROCEDURE — 87040 BLOOD CULTURE FOR BACTERIA: CPT

## 2022-01-01 PROCEDURE — 93005 ELECTROCARDIOGRAM TRACING: CPT

## 2022-01-01 PROCEDURE — P9016 RBC LEUKOCYTES REDUCED: HCPCS

## 2022-01-01 PROCEDURE — 87635 SARS-COV-2 COVID-19 AMP PRB: CPT

## 2022-01-01 PROCEDURE — 83605 ASSAY OF LACTIC ACID: CPT

## 2022-01-01 PROCEDURE — 83690 ASSAY OF LIPASE: CPT

## 2022-01-01 PROCEDURE — 96375 TX/PRO/DX INJ NEW DRUG ADDON: CPT

## 2022-01-01 PROCEDURE — G8427 DOCREV CUR MEDS BY ELIG CLIN: HCPCS | Performed by: NURSE PRACTITIONER

## 2022-01-01 PROCEDURE — G9231 DOC ESRD DIA TRANS PREG: HCPCS | Performed by: INTERNAL MEDICINE

## 2022-01-01 PROCEDURE — G8427 DOCREV CUR MEDS BY ELIG CLIN: HCPCS | Performed by: INTERNAL MEDICINE

## 2022-01-01 PROCEDURE — 86900 BLOOD TYPING SEROLOGIC ABO: CPT

## 2022-01-01 PROCEDURE — G9231 DOC ESRD DIA TRANS PREG: HCPCS | Performed by: NURSE PRACTITIONER

## 2022-01-01 PROCEDURE — 84484 ASSAY OF TROPONIN QUANT: CPT

## 2022-01-01 PROCEDURE — 65610000006 HC RM INTENSIVE CARE

## 2022-01-01 PROCEDURE — G8420 CALC BMI NORM PARAMETERS: HCPCS | Performed by: INTERNAL MEDICINE

## 2022-01-01 PROCEDURE — 82803 BLOOD GASES ANY COMBINATION: CPT

## 2022-01-01 PROCEDURE — 80053 COMPREHEN METABOLIC PANEL: CPT

## 2022-01-01 PROCEDURE — 93000 ELECTROCARDIOGRAM COMPLETE: CPT | Performed by: INTERNAL MEDICINE

## 2022-01-01 PROCEDURE — 74011250636 HC RX REV CODE- 250/636: Performed by: EMERGENCY MEDICINE

## 2022-01-01 PROCEDURE — 1101F PT FALLS ASSESS-DOCD LE1/YR: CPT | Performed by: NURSE PRACTITIONER

## 2022-01-01 PROCEDURE — 71045 X-RAY EXAM CHEST 1 VIEW: CPT

## 2022-01-01 PROCEDURE — 70450 CT HEAD/BRAIN W/O DYE: CPT

## 2022-01-01 PROCEDURE — 75810000455 HC PLCMT CENT VENOUS CATH LVL 2 5182

## 2022-01-01 PROCEDURE — 85025 COMPLETE CBC W/AUTO DIFF WBC: CPT

## 2022-01-01 PROCEDURE — 83735 ASSAY OF MAGNESIUM: CPT

## 2022-01-01 PROCEDURE — 74011000250 HC RX REV CODE- 250: Performed by: EMERGENCY MEDICINE

## 2022-01-01 PROCEDURE — G8536 NO DOC ELDER MAL SCRN: HCPCS | Performed by: INTERNAL MEDICINE

## 2022-01-01 PROCEDURE — 80061 LIPID PANEL: CPT

## 2022-01-01 PROCEDURE — 36415 COLL VENOUS BLD VENIPUNCTURE: CPT

## 2022-01-01 PROCEDURE — G8432 DEP SCR NOT DOC, RNG: HCPCS | Performed by: NURSE PRACTITIONER

## 2022-01-01 PROCEDURE — 71275 CT ANGIOGRAPHY CHEST: CPT

## 2022-01-01 PROCEDURE — 74011000258 HC RX REV CODE- 258: Performed by: EMERGENCY MEDICINE

## 2022-01-01 PROCEDURE — 74011250636 HC RX REV CODE- 250/636

## 2022-01-01 PROCEDURE — 84443 ASSAY THYROID STIM HORMONE: CPT

## 2022-01-01 PROCEDURE — 36430 TRANSFUSION BLD/BLD COMPNT: CPT

## 2022-01-01 PROCEDURE — G8536 NO DOC ELDER MAL SCRN: HCPCS | Performed by: NURSE PRACTITIONER

## 2022-01-01 PROCEDURE — 96368 THER/DIAG CONCURRENT INF: CPT

## 2022-01-01 PROCEDURE — G8420 CALC BMI NORM PARAMETERS: HCPCS | Performed by: NURSE PRACTITIONER

## 2022-01-01 PROCEDURE — P9045 ALBUMIN (HUMAN), 5%, 250 ML: HCPCS | Performed by: EMERGENCY MEDICINE

## 2022-01-01 RX ORDER — CALCIUM GLUCONATE 94 MG/ML
1 INJECTION, SOLUTION INTRAVENOUS ONCE
Status: COMPLETED | OUTPATIENT
Start: 2022-01-01 | End: 2022-01-01

## 2022-01-01 RX ORDER — HYDROCORTISONE SODIUM SUCCINATE 100 MG/2ML
100 INJECTION, POWDER, FOR SOLUTION INTRAMUSCULAR; INTRAVENOUS ONCE
Status: COMPLETED | OUTPATIENT
Start: 2022-01-01 | End: 2022-01-01

## 2022-01-01 RX ORDER — ALBUMIN HUMAN 50 G/1000ML
25 SOLUTION INTRAVENOUS ONCE
Status: COMPLETED | OUTPATIENT
Start: 2022-01-01 | End: 2022-01-01

## 2022-01-01 RX ORDER — ONDANSETRON 2 MG/ML
4 INJECTION INTRAMUSCULAR; INTRAVENOUS
Status: COMPLETED | OUTPATIENT
Start: 2022-01-01 | End: 2022-01-01

## 2022-01-01 RX ORDER — LEVOFLOXACIN 5 MG/ML
750 INJECTION, SOLUTION INTRAVENOUS ONCE
Status: COMPLETED | OUTPATIENT
Start: 2022-01-01 | End: 2022-01-01

## 2022-01-01 RX ORDER — LEVOFLOXACIN 5 MG/ML
750 INJECTION, SOLUTION INTRAVENOUS EVERY 24 HOURS
Status: DISCONTINUED | OUTPATIENT
Start: 2022-01-01 | End: 2022-01-01

## 2022-01-01 RX ORDER — SODIUM CHLORIDE 9 MG/ML
250 INJECTION, SOLUTION INTRAVENOUS AS NEEDED
Status: DISCONTINUED | OUTPATIENT
Start: 2022-01-01 | End: 2022-03-30 | Stop reason: HOSPADM

## 2022-01-01 RX ORDER — PHENYLEPHRINE HYDROCHLORIDE 10 MG/ML
200 INJECTION INTRAVENOUS ONCE
Status: COMPLETED | OUTPATIENT
Start: 2022-01-01 | End: 2022-01-01

## 2022-01-01 RX ORDER — LOSARTAN POTASSIUM 100 MG/1
100 TABLET ORAL DAILY
Qty: 90 TABLET | Refills: 0 | Status: SHIPPED | OUTPATIENT
Start: 2022-01-01

## 2022-01-01 RX ORDER — PHENYLEPHRINE HCL IN 0.9% NACL 1 MG/10 ML
SYRINGE (ML) INTRAVENOUS
Status: COMPLETED
Start: 2022-01-01 | End: 2022-01-01

## 2022-01-01 RX ORDER — NOREPINEPHRINE BITARTRATE/D5W 8 MG/250ML
.5-3 PLASTIC BAG, INJECTION (ML) INTRAVENOUS
Status: DISCONTINUED | OUTPATIENT
Start: 2022-01-01 | End: 2022-03-30 | Stop reason: HOSPADM

## 2022-01-01 RX ORDER — LEVOFLOXACIN 5 MG/ML
500 INJECTION, SOLUTION INTRAVENOUS
Status: DISCONTINUED | OUTPATIENT
Start: 2022-03-31 | End: 2022-03-30 | Stop reason: HOSPADM

## 2022-01-01 RX ORDER — PHENYLEPHRINE HCL IN 0.9% NACL 1 MG/10 ML
400 SYRINGE (ML) INTRAVENOUS ONCE
Status: DISCONTINUED | OUTPATIENT
Start: 2022-01-01 | End: 2022-03-30 | Stop reason: HOSPADM

## 2022-01-01 RX ORDER — ATORVASTATIN CALCIUM 20 MG/1
20 TABLET, FILM COATED ORAL
Qty: 90 TABLET | Refills: 0 | Status: SHIPPED | OUTPATIENT
Start: 2022-01-01

## 2022-01-01 RX ORDER — MIDODRINE HYDROCHLORIDE 5 MG/1
10 TABLET ORAL
Status: COMPLETED | OUTPATIENT
Start: 2022-01-01 | End: 2022-01-01

## 2022-01-01 RX ORDER — NEBIVOLOL 10 MG/1
10 TABLET ORAL DAILY
Qty: 90 TABLET | Refills: 0 | Status: SHIPPED | OUTPATIENT
Start: 2022-01-01

## 2022-01-01 RX ORDER — FERROUS SULFATE 325(65) MG
325 TABLET, DELAYED RELEASE (ENTERIC COATED) ORAL
Qty: 90 TABLET | Refills: 0 | Status: SHIPPED | OUTPATIENT
Start: 2022-01-01

## 2022-01-01 RX ORDER — PHENYLEPHRINE HYDROCHLORIDE 10 MG/ML
INJECTION INTRAVENOUS
Status: COMPLETED
Start: 2022-01-01 | End: 2022-01-01

## 2022-01-01 RX ORDER — SODIUM CHLORIDE 0.9 % (FLUSH) 0.9 %
5-10 SYRINGE (ML) INJECTION AS NEEDED
Status: DISCONTINUED | OUTPATIENT
Start: 2022-01-01 | End: 2022-03-30 | Stop reason: HOSPADM

## 2022-01-01 RX ORDER — PHENYLEPHRINE HCL IN 0.9% NACL 1 MG/10 ML
200 SYRINGE (ML) INTRAVENOUS ONCE
Status: DISCONTINUED | OUTPATIENT
Start: 2022-01-01 | End: 2022-03-30 | Stop reason: HOSPADM

## 2022-01-01 RX ADMIN — PHENYLEPHRINE HYDROCHLORIDE 0.2 MG: 10 INJECTION INTRAVENOUS at 16:38

## 2022-01-01 RX ADMIN — VASOPRESSIN 0.04 UNITS/MIN: 20 INJECTION INTRAVENOUS at 15:54

## 2022-01-01 RX ADMIN — Medication: at 18:30

## 2022-01-01 RX ADMIN — MIDODRINE HYDROCHLORIDE 10 MG: 5 TABLET ORAL at 16:09

## 2022-01-01 RX ADMIN — LEVOFLOXACIN 750 MG: 750 INJECTION, SOLUTION INTRAVENOUS at 14:03

## 2022-01-01 RX ADMIN — DEXTROSE MONOHYDRATE 60 MCG/MIN: 50 INJECTION, SOLUTION INTRAVENOUS at 19:15

## 2022-01-01 RX ADMIN — GLUCAGON HYDROCHLORIDE 5 MG: KIT at 16:01

## 2022-01-01 RX ADMIN — ALBUMIN (HUMAN) 25 G: 12.5 INJECTION, SOLUTION INTRAVENOUS at 16:11

## 2022-01-01 RX ADMIN — DEXTROSE MONOHYDRATE 4 MCG/MIN: 50 INJECTION, SOLUTION INTRAVENOUS at 14:02

## 2022-01-01 RX ADMIN — PHENYLEPHRINE HYDROCHLORIDE 0.2 MG: 10 INJECTION INTRAVENOUS at 16:45

## 2022-01-01 RX ADMIN — IOPAMIDOL 80 ML: 755 INJECTION, SOLUTION INTRAVENOUS at 17:28

## 2022-01-01 RX ADMIN — VANCOMYCIN HYDROCHLORIDE 1000 MG: 1 INJECTION, POWDER, LYOPHILIZED, FOR SOLUTION INTRAVENOUS at 15:46

## 2022-01-01 RX ADMIN — CALCIUM GLUCONATE 1 G: 98 INJECTION, SOLUTION INTRAVENOUS at 15:02

## 2022-01-01 RX ADMIN — PIPERACILLIN AND TAZOBACTAM 4.5 G: 4; .5 INJECTION, POWDER, FOR SOLUTION INTRAVENOUS at 18:16

## 2022-01-01 RX ADMIN — HYDROCORTISONE SODIUM SUCCINATE 100 MG: 100 INJECTION, POWDER, FOR SOLUTION INTRAMUSCULAR; INTRAVENOUS at 15:59

## 2022-01-01 RX ADMIN — ONDANSETRON 4 MG: 2 INJECTION INTRAMUSCULAR; INTRAVENOUS at 15:57

## 2022-01-01 RX ADMIN — SODIUM CHLORIDE 500 ML: 9 INJECTION, SOLUTION INTRAVENOUS at 13:48

## 2022-01-03 PROBLEM — I63.9 STROKE (HCC): Status: ACTIVE | Noted: 2021-01-01

## 2022-01-03 NOTE — PROGRESS NOTES
HISTORY OF PRESENT ILLNESS  Daniel Prather is a 79 y.o. male. HPI    Patient presents for a new office visit. He was referred here primarily due to the finding of left atrial enlargement on echocardiogram which is done as part of a stroke work-up in October 2021. Patient does not have a previous cardiac history. He does have a longstanding history of essential hypertension and more recently end-stage renal disease starting on hemodialysis 4 months ago. Since starting hemodialysis, his blood pressure has been somewhat labile but is slowly improving. He was hospitalized in October 2021 with an acute left lacunar CVA, and echocardiogram done during his hospital stay showed low normal LVEF of 50 to 55%, mild concentric LVH, grade 2 diastolic dysfunction and severe left atrial enlargement but no significant valvular heart disease. He denies ever having any chest pain, shortness of breath, leg swelling, orthopnea or PND. His weight has been fairly stable since being started hemodialysis. He is in the process of being scheduled for a permanent dialysis graft/fistula by his vascular surgeon. Past Medical History:   Diagnosis Date    Chronic kidney disease     ESRD (end stage renal disease) on dialysis ([facility]     H/O echocardiogram 10/28/2021    EF 50 to 55%, mild concentric LVH, grade 2 diastolic dysfunction, severe LAE, mild AI    Hepatitis C     Hydrocele     Hypertension     Stroke ([facility] 10/2021    Left lacunar     Current Outpatient Medications   Medication Sig Dispense Refill    losartan (COZAAR) 100 mg tablet Take 1 Tablet by mouth daily. Indications: high blood pressure 90 Tablet 0    amLODIPine (NORVASC) 10 mg tablet Take 1 Tablet by mouth daily. Indications: high blood pressure 90 Tablet 0    atorvastatin (LIPITOR) 20 mg tablet Take 1 Tablet by mouth nightly. 30 Tablet 0    aspirin (ASPIRIN) 325 mg tablet Take 1 Tablet by mouth daily.  30 Tablet 0    nebivolol (BYSTOLIC) 10 mg tablet Take 10 mg by mouth daily.  elbasvir-grazoprevir (Zepatier)  mg tab Take 1 Tablet by mouth daily. (Patient not taking: Reported on 1/3/2022)      tamsulosin (FLOMAX) 0.4 mg capsule Take 1 Capsule by mouth nightly. (Patient not taking: Reported on 1/3/2022)      sildenafil citrate (VIAGRA) 100 mg tablet take 1/2 to 1 tablet by mouth 1 HOUR PRIOR TO SEXUAL ACTIVITY - DO NOT EXCEED 1 TABLET IN 24 HOURS (Patient not taking: Reported on 1/3/2022)       No Known Allergies     Social History     Tobacco Use    Smoking status: Never Smoker    Smokeless tobacco: Never Used   Vaping Use    Vaping Use: Never used   Substance Use Topics    Alcohol use: No    Drug use: No     History reviewed. No pertinent family history. Review of Systems   Constitutional: Negative for chills, fever and weight loss. HENT: Negative for nosebleeds. Eyes: Negative for blurred vision and double vision. Respiratory: Negative for cough, shortness of breath and wheezing. Cardiovascular: Negative for chest pain, palpitations, orthopnea, claudication, leg swelling and PND. Gastrointestinal: Negative for abdominal pain, heartburn, nausea and vomiting. Genitourinary: Negative for dysuria and hematuria. Musculoskeletal: Negative for falls and myalgias. Skin: Negative for rash. Neurological: Negative for dizziness, focal weakness and headaches. Endo/Heme/Allergies: Does not bruise/bleed easily. Psychiatric/Behavioral: Negative for substance abuse. Visit Vitals  /72 (BP 1 Location: Left upper arm, BP Patient Position: Sitting, BP Cuff Size: Small adult)   Pulse 60   Ht 6' 2\" (1.88 m)   Wt 73.9 kg (163 lb)   SpO2 98%   BMI 20.93 kg/m²       Physical Exam  Constitutional:       Appearance: He is well-developed. HENT:      Head: Normocephalic and atraumatic. Eyes:      Conjunctiva/sclera: Conjunctivae normal.   Neck:      Vascular: No carotid bruit or JVD.       Comments: Right-sided tunneled dialysis catheter  Cardiovascular:      Rate and Rhythm: Normal rate and regular rhythm. Pulses: Normal pulses. Heart sounds: S1 normal and S2 normal. No murmur heard. No gallop. No S3 sounds. Pulmonary:      Breath sounds: Normal breath sounds. No wheezing or rales. Abdominal:      General: Bowel sounds are normal.      Palpations: Abdomen is soft. Tenderness: There is no abdominal tenderness. Musculoskeletal:         General: No swelling or deformity. Cervical back: Neck supple. Skin:     General: Skin is warm and dry. Neurological:      General: No focal deficit present. Mental Status: He is alert and oriented to person, place, and time. Psychiatric:         Mood and Affect: Mood normal.       EKG: Normal sinus rhythm, left axis deviation, voltage criteria for LVH, occasional atrial premature contractions, no ST-T wave changes concerning for ischemia. No change compared to the previous EKG. ASSESSMENT and PLAN  Encounter Diagnoses   Name Primary?  Essential hypertension Yes    Cerebrovascular accident (CVA), unspecified mechanism ([address]     Left atrial dilation     ESRD (end stage renal disease) on dialysis ([address]        Low risk from a cardiac standpoint to proceed with vascular surgery for creation of either a upper extremity dialysis graft or fistula. Patient can stop his aspirin briefly if needed for 5 to 7 days. No additional cardiac testing needed. Left ventricular diastolic dysfunction. This was seen on an echocardiogram in October 2021. This is most likely due to longstanding hypertension. His volume status is now well controlled with hemodialysis. His blood pressure is well controlled on losartan and amlodipine, both of which I would continue. No additional cardiac testing needed. Primary hypertension. This has been poorly controlled in the past and now somewhat labile.   I suspect that once he becomes accustomed to a regular dialysis regimen this will be easier to control. I would defer further adjustments of his medications to his nephrologist.    History of CVA. This occurred in October 2021. Presumed ischemic in nature. He has never had documented arrhythmias in the past.  He has been managed with an aspirin and statin for secondary prevention. End-stage renal disease. Recently started on hemodialysis approximately 4 months ago. Patient can follow-up in the future as needed.

## 2022-01-03 NOTE — LETTER
1/3/2022 9:29 AM    Mr. Donny Brown  [address] [id number]                    [facility] Vascular Specialist,      Donny Brown was seen in our office on 1/3/2022 for cardiac evaluation. From a cardiac standpoint he is cleared for surgery at a low cardiac risk. He may stop his Aspirin for 5 days prior to surgery, if needed. No further cardiac testing needed. Please feel free to contact our office if you have any questions regarding this patient.                Sincerely,          Bobby Calderón MD

## 2022-01-03 NOTE — PROGRESS NOTES
Madison Arredondo presents today for   Chief Complaint   Patient presents with    New Patient     Ref by [facility] for [facility]       Madison Arredondo preferred language for health care discussion is english/other. Is someone accompanying this pt? yes    Is the patient using any DME equipment during [address]? no    Depression Screening:  3 most recent PHQ Screens 1/3/2022   Little interest or pleasure in doing things Not at all   Feeling down, depressed, irritable, or hopeless Not at all   Total Score PHQ 2 0       Learning Assessment:  Learning Assessment 1/3/2022   PRIMARY LEARNER Patient   HIGHEST LEVEL OF EDUCATION - PRIMARY LEARNER  -   BARRIERS PRIMARY LEARNER -   CO-LEARNER CAREGIVER -   PRIMARY LANGUAGE ENGLISH   LEARNER PREFERENCE PRIMARY DEMONSTRATION   ANSWERED BY patient   RELATIONSHIP SELF       Abuse Screening:  Abuse Screening Questionnaire 1/3/2022   Do you ever feel afraid of your partner? N   Are you in a relationship with someone who physically or mentally threatens you? N   Is it safe for you to go home? Y       Fall Risk  Fall Risk Assessment, last 12 mths 1/3/2022   Able to walk? Yes   Fall in past 12 months? 0   Do you feel unsteady? 0   Are you worried about falling 0   Is TUG test greater than 12 seconds? -   Is the gait abnormal? -   Number of falls in past 12 months -   Fall with injury? -           Pt currently taking Anticoagulant therapy? no    Pt currently taking Antiplatelet therapy ?  mg once a day      Coordination of Care:  1. Have you been to the ER, urgent care clinic since your last visit? Hospitalized since your last visit? no    2. Have you seen or consulted any other health care providers outside of the [address] [place] [address] since your last visit? Include any pap smears or colon screening.  no

## 2022-02-13 PROBLEM — I11.9 HYPERTENSIVE HEART DISEASE WITHOUT HEART FAILURE: Status: ACTIVE | Noted: 2021-01-01

## 2022-02-13 PROBLEM — R29.898 LEG WEAKNESS: Status: RESOLVED | Noted: 2021-01-01 | Resolved: 2022-01-01

## 2022-02-13 PROBLEM — I63.9 STROKE (HCC): Status: RESOLVED | Noted: 2021-01-01 | Resolved: 2022-01-01

## 2022-02-13 PROBLEM — R73.9 HYPERGLYCEMIA: Status: ACTIVE | Noted: 2022-01-01

## 2022-02-13 PROBLEM — I63.9 STROKE (CEREBRUM) (HCC): Status: RESOLVED | Noted: 2021-01-01 | Resolved: 2022-01-01

## 2022-02-13 PROBLEM — R53.1 WEAKNESS: Status: RESOLVED | Noted: 2021-01-01 | Resolved: 2022-01-01

## 2022-02-13 PROBLEM — Z86.73 HISTORY OF CVA (CEREBROVASCULAR ACCIDENT): Status: ACTIVE | Noted: 2022-01-01

## 2022-02-13 PROBLEM — E78.5 HYPERLIPIDEMIA LDL GOAL <70: Status: ACTIVE | Noted: 2022-01-01

## 2022-02-14 NOTE — PROGRESS NOTES
Izabella Jeanmarie presents today for   Chief Complaint   Patient presents with    Cholesterol Problem    Hypertension    Anemia       Is someone accompanying this pt? Yes, patient wife     Is the patient using any DME equipment during OV? Yes, patient walks with a cane     Depression Screening:  3 most recent PHQ Screens 1/3/2022   Little interest or pleasure in doing things Not at all   Feeling down, depressed, irritable, or hopeless Not at all   Total Score PHQ 2 0       Learning Assessment:  Learning Assessment 1/3/2022   PRIMARY LEARNER Patient   HIGHEST LEVEL OF EDUCATION - PRIMARY LEARNER  -   BARRIERS PRIMARY LEARNER -   CO-LEARNER CAREGIVER -   PRIMARY LANGUAGE ENGLISH   LEARNER PREFERENCE PRIMARY DEMONSTRATION   ANSWERED BY patient   RELATIONSHIP SELF       Fall Risk  Fall Risk Assessment, last 12 mths 1/3/2022   Able to walk? Yes   Fall in past 12 months? 0   Do you feel unsteady? 0   Are you worried about falling 0   Is TUG test greater than 12 seconds? -   Is the gait abnormal? -   Number of falls in past 12 months -   Fall with injury? -       ADL  ADL Assessment 12/10/2021   Feeding yourself No Help Needed   Getting from bed to chair No Help Needed   Getting dressed No Help Needed   Bathing or showering No Help Needed   Walk across the room (includes cane/walker) No Help Needed   Using the telphone No Help Needed   Taking your medications No Help Needed   Preparing meals Help Needed   Managing money (expenses/bills) No Help Needed   Moderately strenuous housework (laundry) No Help Needed   Shopping for personal items (toiletries/medicines) Help Needed   Shopping for groceries Help Needed   Driving Help Needed   Climbing a flight of stairs No Help Needed   Getting to places beyond walking distances No Help Needed       Travel Screening:    Travel Screening     Question   Response    In the last month, have you been in contact with someone who was confirmed or suspected to have Coronavirus / COVID-19? No / Unsure    Have you had a COVID-19 viral test in the last 14 days? No    Do you have any of the following new or worsening symptoms? Have you traveled internationally or domestically in the last month? No      Travel History   Travel since 01/14/22    No documented travel since 01/14/22         Health Maintenance reviewed and discussed and ordered per Provider. Health Maintenance Due   Topic Date Due    Shingrix Vaccine Age 49> (1 of 2) Never done    Pneumococcal 65+ yrs at Risk Vaccine (1 of 2 - PCV13) Never done    COVID-19 Vaccine (3 - Booster for [employer] series) 09/30/2021   . Coordination of Care:  1. Have you been to the ER, urgent care clinic since your last visit? Hospitalized since your last visit? no    2. Have you seen or consulted any other health care providers outside of the [address] [place] [address] since your last visit? Include any pap smears or colon screening.  No

## 2022-02-14 NOTE — PATIENT INSTRUCTIONS
SUMMARY:    1. Please have Dr. Garza Right office fax notes to us at [phone]. 2.  If you have any problems with your blood pressure, please call your kidney doctor, Dr. Karen Yao.    3.  I have sent you to a blood specialist (Dr. Beka Velasquez), who will you to schedule an appointment for your anemia. 4.  Please start taking Ferrous Sulfate (iron supplement) until you see the blood specialist.    5.  Please start taking your cholesterol medication called Atorvastatin 20 mg every night. 6.  I have given you a list of foods that are high in iron, please incorporate in your diet. 7.  We will recheck fasting labs again in 3 months, please complete 1-2 weeks prior to your next appointment in May. 8.  Please bring me all your medication bottles to next appointment. Learning About High-Iron Foods  What foods are high in iron? The foods you eat contain nutrients, such as vitamins and minerals. Iron is a nutrient. Your body needs the right amount to stay healthy and work as it should. You can use the list below to help you make choices about which foods to eat. Here are some foods that contain iron. They have 1 to 2 milligrams of iron per serving. Fruits  · Figs (dried), 5 figs  Vegetables  · Asparagus (canned), 6 bergman  · Briana, beet, Swiss chard, or turnip greens, 1 cup  · Dried peas, cooked, ½ cup  · Seaweed, spirulina (dried), ¼ cup  · Spinach, (cooked) ½ cup or (raw) 1 cup  Grains  · Cereals, fortified with iron, 1 cup  · Grits (instant, cooked), fortified with iron, ½ cup  Meats and other protein foods  · Beans (kidney, lima, navy, white), canned or cooked, ½ cup  · Beef or lamb, 3 oz  · Chicken giblets, 3 oz  · Chickpeas (garbanzo beans), ½ cup  · Liver of beef, lamb, or pork, 3 oz  · Oysters (cooked), 3 oz  · Sardines (canned), 3 oz  · Soybeans (boiled), ½ cup  · Tofu (firm), ½ cup  Work with your doctor to find out how much of this nutrient you need.  Depending on your health, you may need more or less of it in your diet. Current as of: December 17, 2020               Content Version: 13.0  © 7491-2370 [employer]. Care instructions adapted under license by [employer] (which disclaims liability or warranty for this information). If you have questions about a medical condition or this instruction, always ask your healthcare professional. [place] [address] any warranty or liability for your use of this information.

## 2022-02-14 NOTE — PROGRESS NOTES
Chief Complaint   Patient presents with    Cholesterol Problem    Hypertension    Anemia     Assessment & Plan:     1. History of CVA (cerebrovascular accident)  Assessment & Plan:  Neurology appointment pending  Continue secondary prevention  Orders:  -     atorvastatin (LIPITOR) 20 mg tablet; Take 1 Tablet by mouth nightly., Normal, Disp-90 Tablet, R-0  2. Hyperlipidemia LDL goal <70  Assessment & Plan:  Restart statin, recheck labs in 3 mos  Orders:  -     atorvastatin (LIPITOR) 20 mg tablet; Take 1 Tablet by mouth nightly., Normal, Disp-90 Tablet, R-0  -     LIPID PANEL; Future  -     METABOLIC PANEL, COMPREHENSIVE; Future  3. Hypertensive heart disease without heart failure  Assessment & Plan:  Cardiology note reviewed  BP goal <130/80, continue regimen  4. Anemia requiring transfusions  Assessment & Plan:  Worsening, start Ferrous Sulfate 325 mg daily until seen by heme/onc  Orders:  -     ferrous sulfate (IRON) 325 mg (65 mg iron) EC tablet; Take 1 Tablet by mouth Daily (before breakfast). Indications: anemia from inadequate iron, Normal, Disp-90 Tablet, R-0  -     REFERRAL TO HEMATOLOGY  -     CBC WITH AUTOMATED DIFF; Future  5. ESRD (end stage renal disease) on dialysis [facility])  Assessment & Plan:  Continue HD per renal  6. Hyperglycemia  -     METABOLIC PANEL, COMPREHENSIVE; Future  -     HEMOGLOBIN A1C WITH EAG; Future  7. Chronic hepatitis C without hepatic coma (HCC)  Assessment & Plan:  Continue management per GI  8. Encounter to discuss test results    Follow-up and Dispositions    · Return in about 6 months (around 8/14/2022) for blood pressure, cholesterol, history of stroke, anemia, lab results. Subjective:     HPI    Patient presents today accompanied by wife, Carmel Alert.     History of CVA -  Occurred 10/27/2022  Has upcoming appt with neurology  Treatment:   mg daily, atorvastatin 20 mg  Walks with a cane    Hyperlipidemia  Compliant with meds  S/E from medication: None  Comorbid:  HTN, hx of CVA  Current treatment:  Atorvastatin 20 mg nightly    Hypertension-  Symptoms:  None  BP readings at home are in the 629-775I systolic  Comorbid:  Hx of CVA, HLD  Current treatment:  Losartan 100 mg, nebivolol 10 mg daily    Anemia -   Current symptoms: None  Risk factors: hx of transfusions  Treatment:  None    ESRD -  Scheduled every TTHS  Followed by vascular specialist at [facility]  Had [facility] placed in November  Followed by Dr. David Short  Was referred out to GI  Has seen Dr. Dannielle Trevino and labs/xray were ordered  Has an appointment again on 02/16/2022    Health Maintenance:  COVID-19 vac - due for booster  Pneumonia vac- will wait for pneumonia 20  Shingles vac - recommended      Review of Systems   Constitutional: Negative for chills, fever and malaise/fatigue. Respiratory: Negative for shortness of breath. Cardiovascular: Negative for chest pain. Gastrointestinal: Negative for nausea and vomiting. Neurological: Negative for dizziness and headaches. Objective:   /68 (BP 1 Location: Left upper arm, BP Patient Position: Sitting, BP Cuff Size: Adult)   Pulse 73   Temp 97.1 °F (36.2 °C) (Oral)   Resp 20   Ht 6' 2\" (1.88 m)   Wt 158 lb (71.7 kg)   SpO2 98%   BMI 20.29 kg/m²      Physical Exam  Constitutional:       Appearance: Normal appearance. HENT:      Head: Normocephalic and atraumatic. Cardiovascular:      Rate and Rhythm: Normal rate and regular rhythm. Heart sounds: No murmur heard. No gallop. Pulmonary:      Effort: Pulmonary effort is normal. No respiratory distress. Breath sounds: No wheezing, rhonchi or rales. Musculoskeletal:         General: Normal range of motion. Comments: Ambulated with cane   Skin:     General: Skin is warm and dry. Neurological:      General: No focal deficit present. Mental Status: He is alert and oriented to person, place, and time.    Psychiatric:         Mood and Affect: Mood normal.         Thought Content:  Thought content normal.         Judgment: Judgment normal.        Total time spent:  45 minutes  AARTI Akins

## 2022-03-29 PROBLEM — E87.20 LACTIC ACIDOSIS: Status: ACTIVE | Noted: 2022-01-01

## 2022-03-29 PROBLEM — R57.9 SHOCK (HCC): Status: ACTIVE | Noted: 2022-01-01

## 2022-03-29 PROBLEM — N18.6 ESRD (END STAGE RENAL DISEASE) (HCC): Status: ACTIVE | Noted: 2022-01-01

## 2022-03-29 NOTE — ED NOTES
Central line inserted by Dr. Tez Santiago using sterile technique with assistance of this RN and Lucille Joe. Pt tolerated well.

## 2022-03-29 NOTE — ED TRIAGE NOTES
Pt was at dialysis treatment today and family was called to come  patient because his BP was low. Unknown how long pt received treatment. Dialysis catheter noted to right chest wall. BP 85/50.

## 2022-03-29 NOTE — Clinical Note
Status[de-identified] INPATIENT [101]   Type of Bed: Intensive Care [6]   Inpatient Hospitalization Certified Necessary for the Following Reasons: 4.  Patient requires ICU level of care interventions (further clarification in H&P documentation)   Admitting Diagnosis: Shock [facility]) [[id number]]   Admitting Diagnosis: ESRD (end stage renal disease) ([facility] [[id number]]   Admitting Diagnosis: Lactic acidosis [335571]   Admitting Physician: Jaja Grigsby   Attending Physician: Mariangel Sanchez [[id number]]   Estimated Length of Stay: 2 Midnights   Discharge Plan[de-identified] [facility]

## 2022-03-29 NOTE — ED PROVIDER NOTES
EMERGENCY DEPARTMENT HISTORY AND PHYSICAL EXAM    1:39 PM    Date: 3/29/2022  Patient Name: Luzmraia Preciado    History of Presenting Illness     Chief Complaint   Patient presents with    Hypotension       History Provided By: Patient's Wife  Location/Duration/Severity/Modifying factors   26-year-old male with history of end-stage renal disease, hypertension presented to the ED with hypotension and lethargy. Patient I was undergoing his dialysis treatment today, during the course of treatment the patient had the blood pressure that dropped and became lethargic. Evidently the dialysis clinic told the patient's wife to take him home instead to stop taking the blood pressure medications. She presented here shortly thereafter because he was lethargic and very out of it. Patient himself is lethargic and limits the history taking ability. I spoke to the patient's nephrologist who saw the patient in the clinic and indicates that he believes that the blood pressure medications were the issue (Dr Nellie Finch), and he told him to stop taking them. He evidently was more alert at the clinic than he is now. Family denies any recent events (illness, trauma, falls, etc), was feeling find prior to going to dialysis. PCP: Sean Vega NP    Current Outpatient Medications   Medication Sig Dispense Refill    losartan (COZAAR) 100 mg tablet Take 1 Tablet by mouth daily. Indications: high blood pressure 90 Tablet 0    nebivoloL (Bystolic) 10 mg tablet Take 1 Tablet by mouth daily. 90 Tablet 0    ferrous sulfate (IRON) 325 mg (65 mg iron) EC tablet Take 1 Tablet by mouth Daily (before breakfast). Indications: anemia from inadequate iron 90 Tablet 0    atorvastatin (LIPITOR) 20 mg tablet Take 1 Tablet by mouth nightly. 90 Tablet 0    amLODIPine (NORVASC) 10 mg tablet Take 1 Tablet by mouth daily. Indications: high blood pressure 90 Tablet 0    aspirin (ASPIRIN) 325 mg tablet Take 1 Tablet by mouth daily.  30 Tablet 0  elbasvir-grazoprevir (Zepatier)  mg tab Take 1 Tablet by mouth daily. (Patient not taking: Reported on 1/3/2022)      tamsulosin (FLOMAX) 0.4 mg capsule Take 1 Capsule by mouth nightly. (Patient not taking: Reported on 1/3/2022)      sildenafil citrate (VIAGRA) 100 mg tablet take 1/2 to 1 tablet by mouth 1 HOUR PRIOR TO SEXUAL ACTIVITY - DO NOT EXCEED 1 TABLET IN 24 HOURS (Patient not taking: Reported on 1/3/2022)         Past History     Past Medical History:  Past Medical History:   Diagnosis Date    Chronic kidney disease     ESRD (end stage renal disease) [facility] ([facility]     H/O echocardiogram 10/28/2021    EF 50 to 55%, mild concentric LVH, grade 2 diastolic dysfunction, severe LAE, mild AI    Hepatitis C     Hydrocele     Hypertension     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 No pertinent family history. Social History:  Social History     Tobacco Use    Smoking status: Never Smoker    Smokeless tobacco: Never Used   Vaping Use    Vaping Use: Never used   Substance Use Topics    Alcohol use: No    Drug use: No       Allergies:  No Known Allergies    I reviewed and confirmed the above information with patient and updated as necessary. Review of Systems     Review of Systems   Unable to perform ROS: Mental status change       Physical Exam     Visit Vitals  BP (!) 52/30   Pulse (!) 43   Temp (!) 93.4 °F (34.1 °C)   Resp (!) 0   Wt 71.7 kg (158 lb)   SpO2 (!) 84%   BMI 20.29 kg/m²       Physical Exam  Vitals and nursing note reviewed. Constitutional:       General: He is in acute distress. Appearance: Normal appearance. He is normal weight. He is ill-appearing and toxic-appearing.       Comments: Lethargic   HENT: Head: Normocephalic and atraumatic. Right Ear: External ear normal.      Left Ear: External ear normal.      Nose: Nose normal. No congestion or rhinorrhea. Mouth/Throat:      Mouth: Mucous membranes are dry. Pharynx: No oropharyngeal exudate or posterior oropharyngeal erythema. Eyes:      Conjunctiva/sclera: Conjunctivae normal.      Pupils: Pupils are equal, round, and reactive to light. Cardiovascular:      Rate and Rhythm: Regular rhythm. Bradycardia present. Heart sounds: Normal heart sounds. No murmur heard. No friction rub. Comments: Dialysis catheter to the right upper chest; cold extremities; peripheral pulses weak and thready, central pulses intact  Pulmonary:      Breath sounds: No wheezing, rhonchi or rales. Comments: Tachypneic but not in overt respiratory distress. Abdominal:      General: Abdomen is flat. There is distension (mild distension, nontender). Tenderness: There is no abdominal tenderness. There is no guarding or rebound. Musculoskeletal:         General: No swelling or tenderness. Normal range of motion. Cervical back: Normal range of motion and neck supple. No tenderness. Right lower leg: No edema. Left lower leg: No edema. Skin:     General: Skin is dry. Capillary Refill: Capillary refill takes more than 3 seconds. Findings: No rash. Comments: Cool and dry   Neurological:      General: No focal deficit present. Motor: No weakness. Comments: Moves all extremities. No focal apparent deficits.          Diagnostic Study Results     Labs -  Recent Results (from the past 24 hour(s))   CULTURE, BLOOD    Collection Time: 03/29/22  1:20 PM    Specimen: Blood   Result Value Ref Range    Special Requests: NO SPECIAL REQUESTS      Culture result: NO GROWTH AFTER 15 HOURS     EKG, 12 LEAD, INITIAL    Collection Time: 03/29/22  1:22 PM   Result Value Ref Range    Ventricular Rate 64 BPM    Atrial Rate 64 BPM    P-R Interval 198 ms    QRS Duration 82 ms    Q-T Interval 464 ms    QTC Calculation (Bezet) 478 ms    Calculated P Axis 48 degrees    Calculated R Axis -27 degrees    Calculated T Axis 81 degrees    Diagnosis       Sinus rhythm with marked sinus arrhythmia  T wave abnormality, consider anterior ischemia  Prolonged QT  Abnormal ECG  When compared with ECG of 27-OCT-2021 11:15,  Non-specific change in ST segment in Anterior leads  Non-specific change in ST segment in Lateral leads  T wave inversion now evident in Anterior leads  Nonspecific T wave abnormality, improved in Lateral leads  Confirmed by Diona Sacks ([id number]) on 3/29/2022 10:29:47 PM     CULTURE, BLOOD    Collection Time: 03/29/22  1:30 PM    Specimen: Blood   Result Value Ref Range    Special Requests: NO SPECIAL REQUESTS      Culture result: NO GROWTH AFTER 15 HOURS     CBC WITH AUTOMATED DIFF    Collection Time: 03/29/22  1:30 PM   Result Value Ref Range    WBC 6.1 4.6 - 13.2 K/uL    RBC 2.63 (L) 4.35 - 5.65 M/uL    HGB 8.7 (L) 13.0 - 16.0 g/dL    HCT 25.4 (L) 36.0 - 48.0 %    MCV 96.6 78.0 - 100.0 FL    MCH 33.1 24.0 - 34.0 PG    MCHC 34.3 31.0 - 37.0 g/dL    RDW 20.9 (H) 11.6 - 14.5 %    PLATELET 409 949 - 883 K/uL    NRBC 0.0 0  WBC    ABSOLUTE NRBC 0.00 0.00 - 0.01 K/uL    NEUTROPHILS 60 40 - 73 %    LYMPHOCYTES 22 21 - 52 %    MONOCYTES 14 (H) 3 - 10 %    EOSINOPHILS 2 0 - 5 %    BASOPHILS 1 0 - 2 %    IMMATURE GRANULOCYTES 1 (H) 0.0 - 0.5 %    ABS. NEUTROPHILS 3.6 1.8 - 8.0 K/UL    ABS. LYMPHOCYTES 1.3 0.9 - 3.6 K/UL    ABS. MONOCYTES 0.9 0.05 - 1.2 K/UL    ABS. EOSINOPHILS 0.1 0.0 - 0.4 K/UL    ABS. BASOPHILS 0.1 0.0 - 0.1 K/UL    ABS. IMM.  GRANS. 0.1 (H) 0.00 - 0.04 K/UL    DF AUTOMATED      PLATELET COMMENTS DECREASED PLATELETS      RBC COMMENTS ANISOCYTOSIS  1+        RBC COMMENTS TARGET CELLS  1+       METABOLIC PANEL, COMPREHENSIVE    Collection Time: 03/29/22  1:30 PM   Result Value Ref Range    Sodium 136 136 - 145 mmol/L Potassium 4.2 3.5 - 5.5 mmol/L    Chloride 99 (L) 100 - 111 mmol/L    CO2 28 21 - 32 mmol/L    Anion gap 9 3.0 - 18 mmol/L    Glucose 120 (H) 74 - 99 mg/dL    BUN 27 (H) 7.0 - 18 MG/DL    Creatinine 4.75 (H) 0.6 - 1.3 MG/DL    BUN/Creatinine ratio 6 (L) 12 - 20      GFR est AA 14 (L) >60 ml/min/1.73m2    GFR est non-AA 12 (L) >60 ml/min/1.73m2    Calcium 8.4 (L) 8.5 - 10.1 MG/DL    Bilirubin, total 1.2 (H) 0.2 - 1.0 MG/DL    ALT (SGPT) 72 (H) 16 - 61 U/L    AST (SGOT) 165 (H) 10 - 38 U/L    Alk.  phosphatase 379 (H) 45 - 117 U/L    Protein, total 6.4 6.4 - 8.2 g/dL    Albumin 1.8 (L) 3.4 - 5.0 g/dL    Globulin 4.6 (H) 2.0 - 4.0 g/dL    A-G Ratio 0.4 (L) 0.8 - 1.7     TROPONIN-HIGH SENSITIVITY    Collection Time: 03/29/22  1:30 PM   Result Value Ref Range    Troponin-High Sensitivity 11 0 - 78 ng/L   LIPASE    Collection Time: 03/29/22  1:30 PM   Result Value Ref Range    Lipase 79 73 - 393 U/L   MAGNESIUM    Collection Time: 03/29/22  1:30 PM   Result Value Ref Range    Magnesium 2.2 1.6 - 2.6 mg/dL   TSH 3RD GENERATION    Collection Time: 03/29/22  1:30 PM   Result Value Ref Range    TSH 7.41 (H) 0.36 - 3.74 uIU/mL   POC LACTIC ACID    Collection Time: 03/29/22  1:34 PM   Result Value Ref Range    Lactic Acid (POC) 5.04 (HH) 0.40 - 2.00 mmol/L   BLOOD GAS, ARTERIAL POC    Collection Time: 03/29/22  1:57 PM   Result Value Ref Range    pH (POC) 7.41 7.35 - 7.45      pCO2 (POC) 36.5 35.0 - 45.0 MMHG    pO2 (POC) 92 80 - 100 MMHG    HCO3 (POC) 22.9 22 - 26 MMOL/L    sO2 (POC) 97.2 (H) 92 - 97 %    Base deficit (POC) 1.6 mmol/L    Specimen type (POC) ARTERIAL      Performed by Reford Collar    COVID-19 RAPID TEST    Collection Time: 03/29/22  3:46 PM   Result Value Ref Range    Specimen source Nasopharyngeal      COVID-19 rapid test Not detected NOTD     POC LACTIC ACID    Collection Time: 03/29/22  4:18 PM   Result Value Ref Range    Lactic Acid (POC) 6.96 (HH) 0.40 - 2.00 mmol/L   AMMONIA    Collection Time: 03/29/22 6:10 PM   Result Value Ref Range    Ammonia, plasma 61 (H) 11 - 32 UMOL/L   TYPE & SCREEN    Collection Time: 03/29/22  6:20 PM   Result Value Ref Range    Crossmatch Expiration 04/01/2022,2359     ABO/Rh(D) A POSITIVE     Antibody screen NEG    RBC, ALLOCATE    Collection Time: 03/29/22  6:30 PM   Result Value Ref Range    HISTORY CHECKED? Historical check performed    EMERGENT RELEASE OF UNCROSSMATCHED RED CELLS    Collection Time: 03/29/22  6:30 PM   Result Value Ref Range    Crossmatch Expiration 04/01/2022,2359     ABO/Rh(D) PENDING     Antibody screen PENDING     Unit number [id number]     Blood component type RC LR,1     Unit division 00     Status of unit ISSUED     Crossmatch result Compatible     Unit number [id number]     Blood component type RC LR,1     Unit division 00     Status of unit ISSUED     Crossmatch result Compatible          Radiologic Studies -   CT HEAD WO CONT   Final Result      No acute infarct, mass, nor hemorrhage. Atrophy and chronic microvascular ischemic disease. Severe sinus disease. CT ABD PELV W CONT   Final Result      Large volume of blood within the abdomen and pelvis with IVC appearance of   hypovolemia. More hyperdense focal regions of blood clot in the right upper quadrant adjacent   to the liver. Suspect hepatic injury as source of bleeding. No acute extravasation is identified on this exam. The exam is however in a very   early CTA arterial phase bolus. Findings have been discussed with Dr. Franki Mayen at 1630 hours      CTA CHEST W OR W WO CONT   Final Result      No evidence of PE. Small bilateral pleural effusions. Minimal bibasal atelectasis. Large volume ascites demonstrated in the upper abdomen. Better visualized on   dedicated CT abdomen same day.    Increased fluid within the esophagus consistent with reflux      Findings discussed with emergency department Dr. Franki Mayen  at 6:00 PM 3/29/2022         XR CHEST PORT   Final Result      Low lung volumes with patchy basilar opacities. Findings may be related to   subsegmental atelectasis and/or developing pneumonitis. Follow-up can be   obtained. Findings similar to prior exam.              Medical Decision Making   I am the first provider for this patient. I reviewed the vital signs, available nursing notes, past medical history, past surgical history, family history and social history. Vital Signs-Reviewed the patient's vital signs. EKG: See ED course for my interpretation of EKG(s). Records Reviewed: Nursing Notes, Old Medical Records, Previous Radiology Studies and Previous Laboratory Studies (Time of Review: 1:39 PM)      Provider Notes (Medical Decision Making):   MDM  Number of Diagnoses or Management Options  Diagnosis management comments: Patient is an 79-year-old male presents the emergency room with a chief complaint of altered mental status. Presents in shock with blood pressure in the 70s to 60s. I spoke to his nephrologist, who provided history that blood pressure was low during the dialysis treatment. He believes it might be related to the patient's blood pressure medication (losartan, amlodipine and Bystolic). DDx: Septic shock, hemorrhagic shock, hypovolemic shock, cardiogenic shock, MI, AAA rupture, etc.    Given dialysis status who does not make urine, patient was given 1 L only and started on pressors given concern for possible volume overload. He had a right femoral central line that was placed and started on Levophed and vasopressin given persistent hypotension and shock despite the Levophed infusion. I did speak to the intensivist at [facility], Dr. Mega Haas also suggested Solu-Cortef be administered. I spoke to his nephrologist Dr. Susan Moore who thought possibly the medications were at fault, i.e. beta-blocker etc.    Patient given glucagon as well.   Patient blood pressure did seem to be improving with the pressor administration and his mental status and improving as well. Patient really does need to go down for a CAT scan to rule out intra-abdominal etiology given his white blood cell count is normal there is no obvious source of infection for septic shock and he is persistently in shock despite pressor and fluid administration we need to rule out something more serious or catastrophic in the abdomen. 1600 -- The care was discussed with and the patient was turned over to Dr. Zhao Lopes at shift change, bedside report and handoff was performed for turnover for her to follow-up on the CT imaging and final disposition pending the CT images. ED Course: Progress Notes, Reevaluation, and Consults:  ED Course as of 03/30/22 0735   Tue Mar 29, 2022   1530 EKG interpretation: Sinus rhythm rate of 64 beats minute, marked sinus rhythm is present. [NATHANIEL]   0949 Despite having lactate >4, a standard fluid resuscitation of 30 mL/kg would harm the patient because the patient has End-stage chronic renal disease of stage IV/V . Therefore,  ordering a  1000 mL bolus of fluids instead to replace 30 mL/kg. [NATHANIEL]      ED Course User Index  [NATHANIEL] Evertet Quispe DO       Central Line    Date/Time: 3/29/2022 3:48 PM  Performed by: Everett Quispe DO  Authorized by: Everett Quispe DO     Consent:     Consent obtained:  Written and emergent situation (Wife)    Consent given by:  Spouse    Risks discussed:  Arterial puncture, nerve damage, infection, incorrect placement and bleeding    Alternatives discussed:  No treatment  Pre-procedure details:     Hand hygiene: Hand hygiene performed prior to insertion      Sterile barrier technique: All elements of maximal sterile technique followed      Skin preparation:  ChloraPrep    Skin preparation agent: Skin preparation agent completely dried prior to procedure    Anesthesia (see MAR for exact dosages):      Anesthesia method:  Local infiltration    Local anesthetic:  Lidocaine 1% w/o epi  Procedure details:     Location:  R femoral    Site selection rationale:  Right upper     Procedural supplies:  Triple lumen    Catheter size:  7.5 Fr    Landmarks identified: yes      Number of attempts:  1    Successful placement: yes    Post-procedure details:     Post-procedure:  Line sutured and dressing applied    Assessment:  Blood return through all ports and free fluid flow    Patient tolerance of procedure: Tolerated well, no immediate complications        Critical Care Time: CRITICAL CARE NOTE:    I have spent 80 minutes of critical care time involved in lab review, consultations with specialist, family decision-making, and documentation. During this entire length of time I was immediately available to the patient. Critical Care: The reason for providing this level of medical care for this critically ill patient was due a critical illness that impaired one or more vital organ systems such that there was a high probability of imminent or life threatening deterioration in the patients condition. This care involved high complexity decision making to assess, manipulate, and support vital system functions, to treat this vital organ system failure and to prevent further life threatening deterioration of the patients condition. Time is exclusive of procedural and teaching time. Lizet Barrett DO      Diagnosis     Clinical Impression:   1. Shock ([facility] [address]    2. [facility]        Disposition: TBD    Follow-up Information    None          Discharge Medication List as of 3/29/2022  9:46 PM      CONTINUE these medications which have NOT CHANGED    Details   losartan (COZAAR) 100 mg tablet Take 1 Tablet by mouth daily. Indications: high blood pressure, Normal, Disp-90 Tablet, R-0      nebivoloL (Bystolic) 10 mg tablet Take 1 Tablet by mouth daily. , Normal, Disp-90 Tablet, R-0      ferrous sulfate (IRON) 325 mg (65 mg iron) EC tablet Take 1 Tablet by mouth Daily (before breakfast).  Indications: anemia from inadequate iron, Normal, Disp-90 Tablet, R-0      atorvastatin (LIPITOR) 20 mg tablet Take 1 Tablet by mouth nightly., Normal, Disp-90 Tablet, R-0      amLODIPine (NORVASC) 10 mg tablet Take 1 Tablet by mouth daily. Indications: high blood pressure, Normal, Disp-90 Tablet, R-0      aspirin (ASPIRIN) 325 mg tablet Take 1 Tablet by mouth daily. , Normal, Disp-30 Tablet, R-0      elbasvir-grazoprevir (Zepatier)  mg tab Take 1 Tablet by mouth daily. , Historical Med      tamsulosin (FLOMAX) 0.4 mg capsule Take 1 Capsule by mouth nightly., Historical Med      sildenafil citrate (VIAGRA) 100 mg tablet take 1/2 to 1 tablet by mouth 1 HOUR PRIOR TO SEXUAL ACTIVITY - DO NOT EXCEED 1 TABLET IN 24 HOURS, Historical Med             Carline Lesch DO   Emergency Medicine   March 30, 2022, 1:39 PM     This note is dictated utilizing Dragon voice recognition software. Unfortunately this leads to occasional typographical errors using the voice recognition. I apologize in advance if the situation occurs. If questions occur please do not hesitate to contact me directly. Patient was seen  and treated during the context of the COVID-19 pandemic. Contemporary protocols utilized based on the best available evidence, utilizing evolving public health  guidelines and treatment protocols.     Carline Lesch, DO

## 2022-03-29 NOTE — PROGRESS NOTES
[facility] Pharmacokinetic Monitoring Service - Vancomycin     Christin Rand is a 79 y.o. male starting on vancomycin therapy for Sepsis of Unknown Etiology. Pharmacy consulted by Liban Morin DO for monitoring and adjustment. Target Concentration: Dosing based on anticipated concentration <15 mg/L due to renal impairment/insufficiency    Additional Antimicrobials: Zosyn/Levaquin    Pertinent Laboratory Values: Wt Readings from Last 1 Encounters:   03/29/22 71.7 kg (158 lb)     Temp Readings from Last 1 Encounters:   03/29/22 (!) 94.2 °F (34.6 °C)     No components found for: PROCAL  Estimated Creatinine Clearance: 12 mL/min (A) (based on SCr of 4.75 mg/dL (H)). Recent Labs     03/29/22  1330   WBC 6.1       Pertinent Cultures:  Culture Date Source Results        MRSA Nasal Swab: N/A. Non-respiratory infection. .    Plan:  Concentration-guided dosing due to renal impairment/insufficiency  Start vancomycin 1000 mg x 1  Renal labs as indicated   Vancomycin concentration ordered for AM labs tomorrow   Pharmacy will continue to monitor patient and adjust therapy as indicated    Thank you for the consult,  TAMMY Khoury  3/29/2022

## 2022-03-29 NOTE — ED NOTES
Bedside report given to [facility] team.  emtala completed. Belongings with pt. Family updated by provider.      Report given to Mayuri Payne RN with [facility] ED.

## 2022-03-29 NOTE — ED NOTES
Pt being placed on vapo therm (heated hi flow). See mar for meds infusing via pump. Continuous cardiac monitoring in place. RN remains at bedside continuously with [facility] RN-C. Pt will answer questions appropriately. Pt had moments of calling out names of family. Pt continues to deny any pain. Family in waiting room and has been updated on poc.

## 2022-03-29 NOTE — REMOTE MONITORING
Attempted to contact primary RN regarding this patient. Left message with Bedside RN regarding 3 hour Sepsis bundle.     Mili Engle [address]  Callback # [phone]

## 2022-03-29 NOTE — ED NOTES
16:00 Received report from Dr. Jose Kay who had evaluated patient. Patient boarding in the ED pending bed and imaging. He has been admitted to ICU for hypotension and hypothermia sp partial dialysis; currently on levophed and vasopressin. Received IV hydrocortisone. Patient has elevated lactic and is on antibiotics to cover for possible sepsis. Initially patient very lethargic, not stable to be transferred to CT, we increased vasopressors  BP showed some improvement. Mental status of patient improved. Patient awake and alert. Denies any pain. I personally accompanied patient to CT along with nurse given his persistent hypotension  5:45 PM  Blood pressure 91/51. Patient remains hypothermic. He is back on [employer] and will receive warm humidified oxygen via Vapotherm  Discussed with family (wife and grandsons) about goals of care and poor prognosis. Currently they are having discussion about CODE STATUS. Updated Dr. Viktoria Trevino ICU attending about patient's condition. Once we are going to obtain wet read from radiology, patient will be transferred to ICU. Family decided for patient to be DNR/DNI as \"patient would not want to be intubated and prolong life if not good quality\"  Discussed with radiologist Dr Molly Limon: Large volume of blood within the abdomen and pelvis with IVC appearance of hypovolemia. More hyperdense focal regions of blood clot in the right upper quadrant adjacent to the liver. Suspect hepatic injury as source of bleeding. No active extravasation  No acute head injury. NO PE.   Given intra-abdominal bleeding had further discussion with wife and asked her if any recent trauma. Patient's wife states that patient had fallen on his side at dialysis center and that she did not mention fall previously to Dr. Jose Kay or his ED nurse. Started administering emergent transfusion. Patient received 2 units of uncross matched blood that is all blood available at our facility.  Informed family who agrees with emergent transfusion. Patient remains very hypotensive and now more lethargic. Since he is DNI we started providing some respiratory support with a bag-valve-mask. Patient not hypoxic at time of transfer. Discussed with Dr. Keenan Vences ICU attending. Given that intra-abdominal bleeding could be secondary to fall I contacted [facility] trauma surgery. Discussed with Dr. Vik Reyes trauma surgeon, accepts transfer as a trauma alpha. Informed her that patient is hypotensive and  DNR/DNI. Extensive discussion with family about risks of transfer including death due to his condition and poor overall prognosis. Despite being DNR/DNI family would like patient to be transferred for surgical evaluation/intervention and further transfusion. Patient's hypotension can not be controlled without surgicaL intervention to control internal bleeding or further transfusion. Given the possible traumatic bleed and family wish, I proceeded with medical transport to trauma center for provision of definitive care, since the medical benefits expected from transfer outweigh the risks.

## 2022-03-29 NOTE — ED NOTES
Pt has been accepted at [facility] by Dr. Kanchan Tapia as Alpha Trauma      Attempting to call report at this time.

## 2022-03-29 NOTE — ED NOTES
When discussing results of CT scan with family, wife reports that pt fell while at dialysis today. She said he was moving to the /c bc dialysis had called wife to pick pt up one hr into treatment bc \"pressure was low\". Pt fell from standing position to fall. She reports nurse caught pt head but the rest of his body hit the floor.

## 2022-03-30 LAB
ABO + RH BLD: NORMAL
BLOOD GROUP ANTIBODIES SERPL: NORMAL
SPECIMEN EXP DATE BLD: NORMAL

## 2022-03-31 LAB
ABO + RH BLD: NORMAL
BLD PROD TYP BPU: NORMAL
BLD PROD TYP BPU: NORMAL
BLOOD BANK CMNT PATIENT-IMP: NORMAL
BLOOD GROUP ANTIBODIES SERPL: NORMAL
BPU ID: NORMAL
BPU ID: NORMAL
CROSSMATCH RESULT,%XM: NORMAL
CROSSMATCH RESULT,%XM: NORMAL
SPECIMEN EXP DATE BLD: NORMAL
STATUS OF UNIT,%ST: NORMAL
STATUS OF UNIT,%ST: NORMAL
UNIT DIVISION, %UDIV: 0
UNIT DIVISION, %UDIV: 0

## 2022-03-31 NOTE — ADT AUTH CERT NOTES
Hello,    Case is no longer inpatient.  Case is now emergency dept      Patient Name: Ana Gonsalez Date/Time: 03/29/22 1:09 PM Patient Class: EMERGENCY [[id number]]   [facility] Date/Time: 03/29/22 9:35 PM       Thanks,     Autry Leventhal                                         Tel: [phone]  Fax: [phone]   These lines are both confidential

## 2022-04-04 LAB
BACTERIA SPEC CULT: NORMAL
BACTERIA SPEC CULT: NORMAL
SERVICE CMNT-IMP: NORMAL
SERVICE CMNT-IMP: NORMAL

## 2023-03-01 NOTE — TELEPHONE ENCOUNTER
Addended by: LACY WALDRON on: 3/1/2023 03:17 PM     Modules accepted: Orders     Patient need a medication refill. Please advise    Requested Prescriptions     Pending Prescriptions Disp Refills    losartan (COZAAR) 100 mg tablet 90 Tablet 0     Sig: Take 1 Tablet by mouth daily.  Indications: high blood pressure